# Patient Record
Sex: FEMALE | Race: BLACK OR AFRICAN AMERICAN | NOT HISPANIC OR LATINO
[De-identification: names, ages, dates, MRNs, and addresses within clinical notes are randomized per-mention and may not be internally consistent; named-entity substitution may affect disease eponyms.]

---

## 2017-06-08 ENCOUNTER — APPOINTMENT (OUTPATIENT)
Dept: ORTHOPEDIC SURGERY | Facility: CLINIC | Age: 62
End: 2017-06-08

## 2017-06-08 VITALS — SYSTOLIC BLOOD PRESSURE: 101 MMHG | HEART RATE: 71 BPM | DIASTOLIC BLOOD PRESSURE: 61 MMHG

## 2017-06-08 VITALS — BODY MASS INDEX: 21.85 KG/M2 | HEIGHT: 64 IN | WEIGHT: 128 LBS

## 2017-06-08 DIAGNOSIS — M54.6 PAIN IN THORACIC SPINE: ICD-10-CM

## 2017-06-08 DIAGNOSIS — Z78.9 OTHER SPECIFIED HEALTH STATUS: ICD-10-CM

## 2017-06-08 DIAGNOSIS — Z87.19 PERSONAL HISTORY OF OTHER DISEASES OF THE DIGESTIVE SYSTEM: ICD-10-CM

## 2017-06-08 DIAGNOSIS — Z86.39 PERSONAL HISTORY OF OTHER ENDOCRINE, NUTRITIONAL AND METABOLIC DISEASE: ICD-10-CM

## 2017-06-08 DIAGNOSIS — M54.2 CERVICALGIA: ICD-10-CM

## 2017-06-08 DIAGNOSIS — Z82.61 FAMILY HISTORY OF ARTHRITIS: ICD-10-CM

## 2017-06-08 DIAGNOSIS — Z87.891 PERSONAL HISTORY OF NICOTINE DEPENDENCE: ICD-10-CM

## 2017-06-08 RX ORDER — PANTOPRAZOLE SODIUM 20 MG/1
TABLET, DELAYED RELEASE ORAL
Refills: 0 | Status: ACTIVE | COMMUNITY

## 2017-06-08 RX ORDER — GLIMEPIRIDE 4 MG/1
TABLET ORAL
Refills: 0 | Status: ACTIVE | COMMUNITY

## 2017-06-08 RX ORDER — GLUC/MSM/COLGN2/HYAL/ANTIARTH3 375-375-20
TABLET ORAL
Refills: 0 | Status: ACTIVE | COMMUNITY

## 2017-12-31 ENCOUNTER — INPATIENT (INPATIENT)
Facility: HOSPITAL | Age: 62
LOS: 4 days | Discharge: HOME | DRG: 368 | End: 2018-01-05
Attending: INTERNAL MEDICINE | Admitting: INTERNAL MEDICINE
Payer: COMMERCIAL

## 2017-12-31 VITALS
TEMPERATURE: 98 F | DIASTOLIC BLOOD PRESSURE: 71 MMHG | WEIGHT: 110.01 LBS | HEIGHT: 64 IN | OXYGEN SATURATION: 100 % | SYSTOLIC BLOOD PRESSURE: 119 MMHG | HEART RATE: 79 BPM | RESPIRATION RATE: 19 BRPM

## 2017-12-31 DIAGNOSIS — S52.91XA UNSPECIFIED FRACTURE OF RIGHT FOREARM, INITIAL ENCOUNTER FOR CLOSED FRACTURE: Chronic | ICD-10-CM

## 2017-12-31 DIAGNOSIS — Z29.9 ENCOUNTER FOR PROPHYLACTIC MEASURES, UNSPECIFIED: ICD-10-CM

## 2017-12-31 DIAGNOSIS — I63.9 CEREBRAL INFARCTION, UNSPECIFIED: ICD-10-CM

## 2017-12-31 DIAGNOSIS — E11.9 TYPE 2 DIABETES MELLITUS WITHOUT COMPLICATIONS: ICD-10-CM

## 2017-12-31 DIAGNOSIS — R29.898 OTHER SYMPTOMS AND SIGNS INVOLVING THE MUSCULOSKELETAL SYSTEM: ICD-10-CM

## 2017-12-31 DIAGNOSIS — K21.9 GASTRO-ESOPHAGEAL REFLUX DISEASE WITHOUT ESOPHAGITIS: ICD-10-CM

## 2017-12-31 DIAGNOSIS — E87.1 HYPO-OSMOLALITY AND HYPONATREMIA: ICD-10-CM

## 2017-12-31 DIAGNOSIS — R63.4 ABNORMAL WEIGHT LOSS: ICD-10-CM

## 2017-12-31 DIAGNOSIS — Z90.710 ACQUIRED ABSENCE OF BOTH CERVIX AND UTERUS: Chronic | ICD-10-CM

## 2017-12-31 LAB
ALBUMIN SERPL ELPH-MCNC: 3.2 G/DL — LOW (ref 3.5–5)
ALP SERPL-CCNC: 77 U/L — SIGNIFICANT CHANGE UP (ref 40–120)
ALT FLD-CCNC: 35 U/L DA — SIGNIFICANT CHANGE UP (ref 10–60)
ANION GAP SERPL CALC-SCNC: 10 MMOL/L — SIGNIFICANT CHANGE UP (ref 5–17)
ANION GAP SERPL CALC-SCNC: 8 MMOL/L — SIGNIFICANT CHANGE UP (ref 5–17)
APPEARANCE UR: CLEAR — SIGNIFICANT CHANGE UP
APTT BLD: 27 SEC — LOW (ref 27.5–37.4)
AST SERPL-CCNC: 45 U/L — HIGH (ref 10–40)
BASOPHILS # BLD AUTO: 0.1 K/UL — SIGNIFICANT CHANGE UP (ref 0–0.2)
BASOPHILS NFR BLD AUTO: 1.3 % — SIGNIFICANT CHANGE UP (ref 0–2)
BILIRUB SERPL-MCNC: 0.5 MG/DL — SIGNIFICANT CHANGE UP (ref 0.2–1.2)
BILIRUB UR-MCNC: NEGATIVE — SIGNIFICANT CHANGE UP
BUN SERPL-MCNC: 32 MG/DL — HIGH (ref 7–18)
BUN SERPL-MCNC: 39 MG/DL — HIGH (ref 7–18)
CALCIUM SERPL-MCNC: 9.2 MG/DL — SIGNIFICANT CHANGE UP (ref 8.4–10.5)
CALCIUM SERPL-MCNC: 9.9 MG/DL — SIGNIFICANT CHANGE UP (ref 8.4–10.5)
CHLORIDE SERPL-SCNC: 88 MMOL/L — LOW (ref 96–108)
CHLORIDE SERPL-SCNC: 95 MMOL/L — LOW (ref 96–108)
CO2 SERPL-SCNC: 29 MMOL/L — SIGNIFICANT CHANGE UP (ref 22–31)
CO2 SERPL-SCNC: 29 MMOL/L — SIGNIFICANT CHANGE UP (ref 22–31)
COLOR SPEC: YELLOW — SIGNIFICANT CHANGE UP
CREAT ?TM UR-MCNC: 254 MG/DL — SIGNIFICANT CHANGE UP
CREAT SERPL-MCNC: 0.84 MG/DL — SIGNIFICANT CHANGE UP (ref 0.5–1.3)
CREAT SERPL-MCNC: 1.23 MG/DL — SIGNIFICANT CHANGE UP (ref 0.5–1.3)
DIFF PNL FLD: ABNORMAL
EOSINOPHIL # BLD AUTO: 0.1 K/UL — SIGNIFICANT CHANGE UP (ref 0–0.5)
EOSINOPHIL NFR BLD AUTO: 0.8 % — SIGNIFICANT CHANGE UP (ref 0–6)
FERRITIN SERPL-MCNC: 146 NG/ML — SIGNIFICANT CHANGE UP (ref 15–150)
FOLATE SERPL-MCNC: 8 NG/ML — SIGNIFICANT CHANGE UP (ref 4.8–24.2)
GLUCOSE BLDC GLUCOMTR-MCNC: 109 MG/DL — HIGH (ref 70–99)
GLUCOSE BLDC GLUCOMTR-MCNC: 194 MG/DL — HIGH (ref 70–99)
GLUCOSE BLDC GLUCOMTR-MCNC: 88 MG/DL — SIGNIFICANT CHANGE UP (ref 70–99)
GLUCOSE SERPL-MCNC: 103 MG/DL — HIGH (ref 70–99)
GLUCOSE SERPL-MCNC: 115 MG/DL — HIGH (ref 70–99)
GLUCOSE UR QL: 50 MG/DL
HBA1C BLD-MCNC: 6.7 % — HIGH (ref 4–5.6)
HCT VFR BLD CALC: 34.3 % — LOW (ref 34.5–45)
HCT VFR BLD CALC: 41.9 % — SIGNIFICANT CHANGE UP (ref 34.5–45)
HGB BLD-MCNC: 10.3 G/DL — LOW (ref 11.5–15.5)
HGB BLD-MCNC: 12.3 G/DL — SIGNIFICANT CHANGE UP (ref 11.5–15.5)
INR BLD: 1.14 RATIO — SIGNIFICANT CHANGE UP (ref 0.88–1.16)
IRON SATN MFR SERPL: 24 % — SIGNIFICANT CHANGE UP (ref 15–50)
IRON SATN MFR SERPL: 56 UG/DL — SIGNIFICANT CHANGE UP (ref 40–150)
KETONES UR-MCNC: ABNORMAL
LDH SERPL L TO P-CCNC: 251 U/L — HIGH (ref 120–225)
LEUKOCYTE ESTERASE UR-ACNC: ABNORMAL
LYMPHOCYTES # BLD AUTO: 1.8 K/UL — SIGNIFICANT CHANGE UP (ref 1–3.3)
LYMPHOCYTES # BLD AUTO: 18.4 % — SIGNIFICANT CHANGE UP (ref 13–44)
MAGNESIUM SERPL-MCNC: 2.1 MG/DL — SIGNIFICANT CHANGE UP (ref 1.6–2.6)
MCHC RBC-ENTMCNC: 22 PG — LOW (ref 27–34)
MCHC RBC-ENTMCNC: 22.5 PG — LOW (ref 27–34)
MCHC RBC-ENTMCNC: 29.4 GM/DL — LOW (ref 32–36)
MCHC RBC-ENTMCNC: 30.1 GM/DL — LOW (ref 32–36)
MCV RBC AUTO: 74.7 FL — LOW (ref 80–100)
MCV RBC AUTO: 75 FL — LOW (ref 80–100)
MONOCYTES # BLD AUTO: 1.2 K/UL — HIGH (ref 0–0.9)
MONOCYTES NFR BLD AUTO: 12.3 % — SIGNIFICANT CHANGE UP (ref 2–14)
NEUTROPHILS # BLD AUTO: 6.5 K/UL — SIGNIFICANT CHANGE UP (ref 1.8–7.4)
NEUTROPHILS NFR BLD AUTO: 67.1 % — SIGNIFICANT CHANGE UP (ref 43–77)
NITRITE UR-MCNC: NEGATIVE — SIGNIFICANT CHANGE UP
OSMOLALITY UR: 705 MOS/KG — SIGNIFICANT CHANGE UP (ref 50–1200)
PH UR: 5 — SIGNIFICANT CHANGE UP (ref 5–8)
PHOSPHATE SERPL-MCNC: 1.4 MG/DL — LOW (ref 2.5–4.5)
PLATELET # BLD AUTO: 258 K/UL — SIGNIFICANT CHANGE UP (ref 150–400)
PLATELET # BLD AUTO: 311 K/UL — SIGNIFICANT CHANGE UP (ref 150–400)
POTASSIUM SERPL-MCNC: 3.7 MMOL/L — SIGNIFICANT CHANGE UP (ref 3.5–5.3)
POTASSIUM SERPL-MCNC: 4.2 MMOL/L — SIGNIFICANT CHANGE UP (ref 3.5–5.3)
POTASSIUM SERPL-SCNC: 3.7 MMOL/L — SIGNIFICANT CHANGE UP (ref 3.5–5.3)
POTASSIUM SERPL-SCNC: 4.2 MMOL/L — SIGNIFICANT CHANGE UP (ref 3.5–5.3)
PROT SERPL-MCNC: 8.9 G/DL — HIGH (ref 6–8.3)
PROT UR-MCNC: 30 MG/DL
PROTHROM AB SERPL-ACNC: 12.5 SEC — SIGNIFICANT CHANGE UP (ref 9.8–12.7)
RBC # BLD: 4.59 M/UL — SIGNIFICANT CHANGE UP (ref 3.8–5.2)
RBC # BLD: 5.58 M/UL — HIGH (ref 3.8–5.2)
RBC # FLD: 14 % — SIGNIFICANT CHANGE UP (ref 10.3–14.5)
RBC # FLD: 14.3 % — SIGNIFICANT CHANGE UP (ref 10.3–14.5)
SODIUM SERPL-SCNC: 127 MMOL/L — LOW (ref 135–145)
SODIUM SERPL-SCNC: 132 MMOL/L — LOW (ref 135–145)
SODIUM UR-SCNC: 15 MMOL/L — LOW (ref 40–220)
SP GR SPEC: 1.01 — SIGNIFICANT CHANGE UP (ref 1.01–1.02)
TIBC SERPL-MCNC: 234 UG/DL — LOW (ref 250–450)
TROPONIN I SERPL-MCNC: 0.03 NG/ML — SIGNIFICANT CHANGE UP (ref 0–0.04)
TSH SERPL-MCNC: 0.98 UU/ML — SIGNIFICANT CHANGE UP (ref 0.34–4.82)
UIBC SERPL-MCNC: 178 UG/DL — SIGNIFICANT CHANGE UP (ref 110–370)
UROBILINOGEN FLD QL: 1
VIT B12 SERPL-MCNC: 1379 PG/ML — HIGH (ref 232–1245)
WBC # BLD: 5.7 K/UL — SIGNIFICANT CHANGE UP (ref 3.8–10.5)
WBC # BLD: 9.7 K/UL — SIGNIFICANT CHANGE UP (ref 3.8–10.5)
WBC # FLD AUTO: 5.7 K/UL — SIGNIFICANT CHANGE UP (ref 3.8–10.5)
WBC # FLD AUTO: 9.7 K/UL — SIGNIFICANT CHANGE UP (ref 3.8–10.5)

## 2017-12-31 PROCEDURE — 99255 IP/OBS CONSLTJ NEW/EST HI 80: CPT

## 2017-12-31 PROCEDURE — 72125 CT NECK SPINE W/O DYE: CPT | Mod: 26

## 2017-12-31 PROCEDURE — 99285 EMERGENCY DEPT VISIT HI MDM: CPT | Mod: 25

## 2017-12-31 PROCEDURE — 83020 HEMOGLOBIN ELECTROPHORESIS: CPT | Mod: 26

## 2017-12-31 PROCEDURE — 70450 CT HEAD/BRAIN W/O DYE: CPT | Mod: 26

## 2017-12-31 RX ORDER — FAMOTIDINE 10 MG/ML
20 INJECTION INTRAVENOUS
Qty: 0 | Refills: 0 | Status: DISCONTINUED | OUTPATIENT
Start: 2017-12-31 | End: 2017-12-31

## 2017-12-31 RX ORDER — SODIUM CHLORIDE 9 MG/ML
1000 INJECTION INTRAMUSCULAR; INTRAVENOUS; SUBCUTANEOUS ONCE
Qty: 0 | Refills: 0 | Status: COMPLETED | OUTPATIENT
Start: 2017-12-31 | End: 2017-12-31

## 2017-12-31 RX ORDER — SODIUM CHLORIDE 9 MG/ML
500 INJECTION INTRAMUSCULAR; INTRAVENOUS; SUBCUTANEOUS ONCE
Qty: 0 | Refills: 0 | Status: COMPLETED | OUTPATIENT
Start: 2017-12-31 | End: 2017-12-31

## 2017-12-31 RX ORDER — POTASSIUM PHOSPHATE, MONOBASIC POTASSIUM PHOSPHATE, DIBASIC 236; 224 MG/ML; MG/ML
15 INJECTION, SOLUTION INTRAVENOUS ONCE
Qty: 0 | Refills: 0 | Status: COMPLETED | OUTPATIENT
Start: 2017-12-31 | End: 2017-12-31

## 2017-12-31 RX ORDER — INSULIN LISPRO 100/ML
VIAL (ML) SUBCUTANEOUS
Qty: 0 | Refills: 0 | Status: DISCONTINUED | OUTPATIENT
Start: 2017-12-31 | End: 2018-01-05

## 2017-12-31 RX ORDER — GLIMEPIRIDE 1 MG
0 TABLET ORAL
Qty: 0 | Refills: 0 | COMMUNITY

## 2017-12-31 RX ORDER — GLIMEPIRIDE 1 MG
0 TABLET ORAL
Qty: 30 | Refills: 0 | COMMUNITY

## 2017-12-31 RX ORDER — ATORVASTATIN CALCIUM 80 MG/1
40 TABLET, FILM COATED ORAL AT BEDTIME
Qty: 0 | Refills: 0 | Status: DISCONTINUED | OUTPATIENT
Start: 2017-12-31 | End: 2018-01-05

## 2017-12-31 RX ORDER — SODIUM CHLORIDE 9 MG/ML
1000 INJECTION INTRAMUSCULAR; INTRAVENOUS; SUBCUTANEOUS
Qty: 0 | Refills: 0 | Status: DISCONTINUED | OUTPATIENT
Start: 2017-12-31 | End: 2018-01-01

## 2017-12-31 RX ORDER — FAMOTIDINE 10 MG/ML
40 INJECTION INTRAVENOUS
Qty: 0 | Refills: 0 | Status: DISCONTINUED | OUTPATIENT
Start: 2017-12-31 | End: 2017-12-31

## 2017-12-31 RX ORDER — ENOXAPARIN SODIUM 100 MG/ML
40 INJECTION SUBCUTANEOUS DAILY
Qty: 0 | Refills: 0 | Status: DISCONTINUED | OUTPATIENT
Start: 2017-12-31 | End: 2018-01-04

## 2017-12-31 RX ORDER — PANTOPRAZOLE SODIUM 20 MG/1
40 TABLET, DELAYED RELEASE ORAL
Qty: 0 | Refills: 0 | Status: DISCONTINUED | OUTPATIENT
Start: 2017-12-31 | End: 2018-01-05

## 2017-12-31 RX ADMIN — ATORVASTATIN CALCIUM 40 MILLIGRAM(S): 80 TABLET, FILM COATED ORAL at 22:01

## 2017-12-31 RX ADMIN — SODIUM CHLORIDE 2000 MILLILITER(S): 9 INJECTION INTRAMUSCULAR; INTRAVENOUS; SUBCUTANEOUS at 09:10

## 2017-12-31 RX ADMIN — POTASSIUM PHOSPHATE, MONOBASIC POTASSIUM PHOSPHATE, DIBASIC 62.5 MILLIMOLE(S): 236; 224 INJECTION, SOLUTION INTRAVENOUS at 22:31

## 2017-12-31 RX ADMIN — SODIUM CHLORIDE 75 MILLILITER(S): 9 INJECTION INTRAMUSCULAR; INTRAVENOUS; SUBCUTANEOUS at 22:31

## 2017-12-31 RX ADMIN — SODIUM CHLORIDE 2000 MILLILITER(S): 9 INJECTION INTRAMUSCULAR; INTRAVENOUS; SUBCUTANEOUS at 04:13

## 2017-12-31 RX ADMIN — Medication 1: at 22:31

## 2017-12-31 RX ADMIN — PANTOPRAZOLE SODIUM 40 MILLIGRAM(S): 20 TABLET, DELAYED RELEASE ORAL at 18:15

## 2017-12-31 RX ADMIN — ENOXAPARIN SODIUM 40 MILLIGRAM(S): 100 INJECTION SUBCUTANEOUS at 12:45

## 2017-12-31 RX ADMIN — SODIUM CHLORIDE 2000 MILLILITER(S): 9 INJECTION INTRAMUSCULAR; INTRAVENOUS; SUBCUTANEOUS at 02:42

## 2017-12-31 RX ADMIN — SODIUM CHLORIDE 75 MILLILITER(S): 9 INJECTION INTRAMUSCULAR; INTRAVENOUS; SUBCUTANEOUS at 10:09

## 2017-12-31 NOTE — ED ADULT NURSE REASSESSMENT NOTE - NS ED NURSE REASSESS COMMENT FT1
Pt admitted to the telemetry floor. Pt is resting comfortably in bed, pt was able to eat and keep down yogurt. Report giving to RUI yung

## 2017-12-31 NOTE — ED PROVIDER NOTE - OBJECTIVE STATEMENT
62yoF with h/o DM p/w weakness. States that 11AM today when dressing noted LUE weakness primarily around shoulder joint, unsure when it resolved but states currently resolved. States has had GERD and belches a lot, causing regurgitation, and as a result has not had an appetite and not eaten very well x1 wk and lost 10 lbs as a result. Denies current neuro deficits, slurred speech, weakness, sens loss, CP, SOB, or any other symptoms.  Meds: glimepiride

## 2017-12-31 NOTE — ED PROVIDER NOTE - MEDICAL DECISION MAKING DETAILS
LUE weakness since 11AM today, mild on exam. CTH negative. Also noted dehydration. Well appearing, hemodynamically stable. Admitted for concern for CVA and stroke w/u and monitoring.

## 2017-12-31 NOTE — ED ADULT TRIAGE NOTE - OTHER COMPLAINTS
pt also stated her left arm feels weak also started this morning pt also stated her left arm feels weak also started this morning at 11 am pt also stated her left arm feels weak  started this morning at 11 am

## 2017-12-31 NOTE — H&P ADULT - PROBLEM SELECTOR PLAN 4
-Reporting worsening symptoms with weight loss  -May warrant non emergent EGD and/or CT of the abdomen for further investigation -Reporting worsening symptoms with weight loss  -Famotidine 20mg q 12hrs for symptom control; acid reflux  -May warrant non emergent EGD and/or CT of the abdomen for further investigation -Reporting worsening symptoms with weight loss  -Protonix 40mg q 12hrs for symptom control; acid reflux. Discussed with Dr. Perodmo  -May warrant non emergent EGD and/or CT of the abdomen for further investigation

## 2017-12-31 NOTE — ED ADULT NURSE REASSESSMENT NOTE - NS ED NURSE REASSESS COMMENT FT1
Patient received from RUI Waggoner, alert, responsive, resting in stretcher, 20G to left AC. Awaiting inpatient bed. On tele box MARIYA

## 2017-12-31 NOTE — H&P ADULT - HISTORY OF PRESENT ILLNESS
62 years old female from home walks independently works as a , with PMH of DM II, CHRISTIAN, GERD, and PSH of bowel ileus s/p laparotomy, s/p hysterectomy, C/S x2, and Rt wrist fracture with metal plate and screw(2013) came to ED c/o left side arm weakness onset yesterday morning which lasted a few hours and then resolved. No other focal neurologic symptoms such as change in mentation, weakness of the other limbs, slurred speech or decreased sensation of any part of the body. Pt has lost 10 lbs over the last week with worsening acid regurgitation and frequent nausea, but did not vomit or have any bowel habit change. Denies fever, chills, chest pain, SOB, or any other symptoms. Last check-up with PMD was in August this year, her Hba1c was 6.6 and everything was fine per patient. Last EGD: 3 years ago, last colonoscopy: about 5 years ago, without any significant findings.     In ED, patient' VS borderline BP(her usual SBP is around 110),  EKG NSR at 68BPM, CT head negative, 62 years old female from home walks independently works as a , with PMH of DM II, CHRISTIAN, GERD, and PSH of bowel ileus s/p laparotomy, s/p hysterectomy, C/S x2, and Rt wrist fracture with metal plate and screw(2013) came to ED c/o left side arm weakness onset yesterday morning which lasted a few hours and then resolved. No other focal neurologic symptoms such as change in mentation, weakness of the other limbs, slurred speech or decreased sensation of any part of the body. Pt has lost 10 lbs over the last week with worsening acid regurgitation and frequent nausea, but did not vomit or have any bowel habit change. She also reports poor PO intake. Denies fever, chills, chest pain, SOB, or any other symptoms. Last check-up with PMD was in August this year, her Hba1c was 6.6 and everything was fine per patient. She stopped taking glimepiride 1 month ago on her own, wanted to control her DM with diet modification. Last EGD: 3 years ago, last colonoscopy: about 5 years ago, without any significant findings.     In ED, patient' VS borderline BP(her usual SBP is around 110), EKG NSR at 68BPM, CT head negative, labs Na 127, BUN/Cr 39/1.23, CE x1 neg, H/H 12.3/41.9 microcytic hypochromic with normal RDW. Currently pt does not have LROM. Pt is admitted for suspected TIA, and generalized weakness from hyponatremia likely from poor oral intake.

## 2017-12-31 NOTE — CONSULT NOTE ADULT - ASSESSMENT
Impression:  left arm weakness, transient, lasting for hours concerning for ischemic stroke       Recommendations:  1.             Admit to telemetry   2.             MRI brain, MRA head without contrast, Carotid duplex (CD).  If unable to get MR imaging, please consider CTA head and neck in 24hours (no need for CD in this case).  If the patient is unable to get MR and unable to get IV contrast please repeat the CTH in 24hours and get a CD.  3.             TTE  4.             Please check HbA1C and fasting lipid profile  5.             Start ASA 81mg  and Lipitor 40mg HS  6.             BP goal of normal  7.             Frequent neurochecks  8.          Formal speech and swallow evaluation  9.          PT evaluation  10.          STAT CTH IF the patient has sudden change in mental status or neurological exam  11.          DVT PPx    Thank you for the courtesy of this consult.

## 2017-12-31 NOTE — H&P ADULT - NSHPPHYSICALEXAM_GEN_ALL_CORE
PHYSICAL EXAM:  GENERAL: NAD, cachectic  HEAD:  Atraumatic, Normocephalic  EYES: EOMI, PERRLA, conjunctiva and sclera clear  ENMT: No tonsillar erythema, exudates, or enlargement; dry mucous membranes, Good dentition, No lesions  NECK: Supple, No JVD, Normal thyroid  NERVOUS SYSTEM:  Alert & Oriented X3, Good concentration; Motor Strength 5/5 B/L upper and lower extremities  CHEST/LUNG: Clear to percussion bilaterally; No rales, rhonchi, wheezing, or rubs  HEART: Regular rate and rhythm; No murmurs, rubs, or gallops  ABDOMEN: Soft, Nontender, Nondistended; Bowel sounds present  EXTREMITIES:  2+ Peripheral Pulses bilaterally, No clubbing, cyanosis, or edema  LYMPH: No lymphadenopathy noted  SKIN: No rashes or lesions    T(C): 37 (12-31-17 @ 07:33), Max: 37 (12-31-17 @ 07:33)  HR: 67 (12-31-17 @ 07:33) (67 - 79)  BP: 97/60 (12-31-17 @ 07:33) (97/60 - 119/71)  RR: 19 (12-31-17 @ 07:33) (18 - 19)  SpO2: 98% (12-31-17 @ 07:33) (98% - 100%)  Wt(kg): --  I&O's Summary

## 2017-12-31 NOTE — ED PROVIDER NOTE - PHYSICAL EXAMINATION
Afebrile, hemodynamically stable  NAD, well appearing  Head NCAT  EOMI  MMM  RRR, nml S1/S2, no m/r/g  Lungs CTAB, no w/r/r  Abd soft, NT, ND, nml BS, no rebound or guarding  AAO, CN's 3-12 intact. NIH 1 as per below, no other deficits.  GOVEA spontaneously, no leg cyanosis or edema  Skin warm, dry, no rashes or hives

## 2017-12-31 NOTE — ED ADULT NURSE NOTE - CHIEF COMPLAINT QUOTE
c/o feeling weak/unable to eat x 1 week and stated she lost 10 lbs/pt also stated her left arm feels weak started this morning at 11 am

## 2017-12-31 NOTE — CONSULT NOTE ADULT - SUBJECTIVE AND OBJECTIVE BOX
Patient is a 62y old  Female who presents with a chief complaint of Left side arm weakness onset yesterday morning (31 Dec 2017 09:13)      HPI:  62 years old female from home walks independently works as a , with PMH of DM II, CHRISTIAN, GERD, and PSH of bowel ileus s/p laparotomy, s/p hysterectomy, C/S x2, and Rt wrist fracture with metal plate and screw() came to ED c/o left side arm weakness onset yesterday morning which lasted a few hours and then resolved. No other focal neurologic symptoms such as change in mentation, weakness of the other limbs, slurred speech or decreased sensation of any part of the body. Pt has lost 10 lbs over the last week with worsening acid regurgitation and frequent nausea, but did not vomit or have any bowel habit change. She also reports poor PO intake. Denies fever, chills, chest pain, SOB, or any other symptoms. Last check-up with PMD was in August this year, her Hba1c was 6.6 and everything was fine per patient. She stopped taking glimepiride 1 month ago on her own, wanted to control her DM with diet modification. Last EGD: 3 years ago, last colonoscopy: about 5 years ago, without any significant findings.     In ED, patient' VS borderline BP(her usual SBP is around 110), EKG NSR at 68BPM, CT head negative, labs Na 127, BUN/Cr 39/1.23, CE x1 neg, H/H 12.3/41.9 microcytic hypochromic with normal RDW. Currently pt does not have LROM. Pt is admitted for suspected TIA, and generalized weakness from hyponatremia likely from poor oral intake. (31 Dec 2017 09:13)           The patient was last know well at  The patient lives at home/ NH.  The patient walks without assistance/ with a cane or walker    Neurological Review of Systems:  No difficulty with language.  No vision loss or double vision.  No dizziness, vertigo or new hearing loss.  No difficulty with speech or swallowing.  No focal weakness.  No focal sensory changes.  No numbness or tingling in the bilateral lower extremities.  No difficulty with balance.  No difficulty with ambulation.      MEDICATIONS  (STANDING):  atorvastatin 40 milliGRAM(s) Oral at bedtime  enoxaparin Injectable 40 milliGRAM(s) SubCutaneous daily  insulin lispro (HumaLOG) corrective regimen sliding scale   SubCutaneous Before meals and at bedtime  pantoprazole    Tablet 40 milliGRAM(s) Oral two times a day before meals  sodium chloride 0.9%. 1000 milliLiter(s) (75 mL/Hr) IV Continuous <Continuous>    MEDICATIONS  (PRN):    Allergies    aspirin (Nausea)    Intolerances      PAST MEDICAL & SURGICAL HISTORY:  GERD (gastroesophageal reflux disease)  Diabetes  Closed right radial fracture   delivery delivered  H/O: hysterectomy    FAMILY HISTORY:  No pertinent family history in first degree relatives    SOCIAL HISTORY: non smoker/ former smoker/ active smoker    Review of Systems:  Constitutional: No generalized weakness. No fevers or chills.                    Eyes, Ears, Mouth, Throat: No vision loss   Respiratory: No shortness of breath or cough.                                Cardiovascular: No chest pain or palpitations  Gastrointestinal: No nausea or vomiting.                                         Genitourinary: No urinary incontinence or burning on urination.  Musculoskeletal: No joint pain.                                                           Dermatologic: No rash.  Neurological: as per HPI                                                                      Psychiatric: No behavioral problems.  Endocrine: No known hypoglycemia.               Hematologic/Lymphatic: No easy bleeding.    O:  Vital Signs Last 24 Hrs  T(C): 36.6 (31 Dec 2017 12:05), Max: 37 (31 Dec 2017 07:33)  T(F): 97.8 (31 Dec 2017 12:05), Max: 98.6 (31 Dec 2017 07:33)  HR: 67 (31 Dec 2017 12:05) (67 - 79)  BP: 97/60 (31 Dec 2017 12:05) (97/60 - 119/71)  BP(mean): --  RR: 19 (31 Dec 2017 12:05) (18 - 19)  SpO2: 98% (31 Dec 2017 12:05) (98% - 100%)    General Exam:   General appearance: No acute distress                 Cardiovascular: Pedal dorsalis pulses intact bilaterally    Neurological Exam:  NIH Stroke Scale (NIHSS):   1a. LOConscious:  0-alert 1-lethargy 2-obtund 3-coma:    _____  1b. LOC Questions:  0-both 1-one 2-none                       _____  1c. LOC Commands:  0-both 1-one 2-none                     _____  2.   Gaze:  0-nl 1-partial 2-conjugate                                _____  3.   Visual:  0-nl 1-part kendra 2-full kendra 3-bilat kendra         _____  4.   Facial Palsy:  0-nl 1-minor 2-part 3-complete             _____  5.   Motor Arm:  0-nl 1-drift 2-effort 3-no effort         Left             _____                              4-no move UN-amputated                     Right  _____  6.   Motor Leg:                                                                 Left   _____                                                                                   Right _____  7.   Ataxia:  0-nl 1-one limb 2-two UN-amp                      _____  8.   Sensory:  0-nl 1-mild 2-severe                                  _____  9.   Language:  0-nl 1-mild 2-severe 3-mute                     _____  10.  Dysarthria:  0-nl 1-mild 2-severe 3-barrier                  _____  11.  Extinction/Inattention:  0-nl 1-mild 2-deep                 _____         TOTAL NIHSS       ________    Mental Status: Orientated to self, date and place.  Attention intact.  No dysarthria, aphasia or neglect.  Knowledge intact.  Registration intact.  Short and long term memory grossly intact.      Cranial Nerves: CN I - not tested.  PERRL, EOMI, VFF, no nystagmus or diplopia.  No APD.  Fundi not visualized bilaterally.  CN V1-3 intact to light touch and pinprick.  No facial asymmetry.  Hearing intact to finger rub bilaterally.  Tongue, uvula and palate midline.  Sternocleidomastoid and Trapezius intact bilaterally.    Motor:   Tone: normal.                  Strength intact throughout  No pronator drift bilaterally                      No dysmetria on finger-nose-finger or heel-shin-heel  No truncal ataxia.  No resting, postural or action tremor.  No myoclonus.    Sensation: intact to light touch, pinprick, vibration and proprioception    Deep Tendon Reflexes: 1+ bilateral biceps, triceps, brachioradialis, knee and ankle  Toes flexor bilaterally    Gait: normal and stable.  Rhomberg -avis.    Other:      LABS:                        10.3   5.7   )-----------( 258      ( 31 Dec 2017 10:35 )             34.3     12-    132<L>  |  95<L>  |  32<H>  ----------------------------<  115<H>  3.7   |  29  |  0.84    Ca    9.2      31 Dec 2017 10:35  Phos  1.4       Mg     2.1         TPro  8.9<H>  /  Alb  3.2<L>  /  TBili  0.5  /  DBili  x   /  AST  45<H>  /  ALT  35  /  AlkPhos  77      PT/INR - ( 31 Dec 2017 10:35 )   PT: 12.5 sec;   INR: 1.14 ratio         PTT - ( 31 Dec 2017 10:35 )  PTT:27.0 sec    LDL  [id number] A1C, Whole Blood: 6.7 % ( @ 13:51)      RADIOLOGY & ADDITIONAL STUDIES:    EKG:  < from: CT Head No Cont (17 @ 03:13) > (images reviwed)  IMPRESSION:     No acute intracranial hemorrhage or mass effect.    If clinical symptoms persist or worsen, more sensitive evaluation with   brain MRI may be obtained, if no contraindications exist.    < end of copied text >    Impression:  left arm weakness       Recommendations:  1.             Admit to telemetry   2.             MRI brain, MRA head without contrast, Carotid duplex (CD).  If unable to get MR imaging, please consider CTA head and neck in 24hours (no need for CD in this case).  If the patient is unable to get MR and unable to get IV contrast please repeat the CTH in 24hours and get a CD.  3.             TTE  4.             Please check HbA1C and fasting lipid profile  5.             Start ASA 81mg (or ASA 325mg rectally) and Lipitor 40mg HS  6.             BP goal of normal/ permissive HTN of SBP <200/<180<160  7.             NS at 125 cc/h/ D5 NS at 125 cc/hour, if NPO, if cardiac function allows, to ensure good perfusion  8.             Frequent neurochecks  9.             Urine Tox  10.          Able to resume normal diet as passed bedside swallowing test/ NPO for now  11.          Formal speech and swallow evaluation  12.          PT evaluation  13.          STAT CTH IF the patient has sudden change in mental status or neurological exam  14.          DVT PPx    Thank you for the courtesy of this consult. HPI:  62 years old female from home walks independently works as a , with PMH of DM II, CHRISTIAN, GERD, and PSH of bowel ileus s/p laparotomy, s/p hysterectomy, C/S x2, and Rt wrist fracture with metal plate and screw() came to ED c/o left side arm weakness onset yesterday morning around 11am.  Symptoms lasted for a few hours and then resolved. No difficulty with language.  No vision loss or double vision.  No dizziness, vertigo or new hearing loss.  No difficulty with speech or swallowing.  No focal weakness.  No focal sensory changes.  No numbness or tingling in the bilateral lower extremities.  No difficulty with balance.  No difficulty with ambulation.      patient lives at home and ambulates without help.    MEDICATIONS  (STANDING):  atorvastatin 40 milliGRAM(s) Oral at bedtime  enoxaparin Injectable 40 milliGRAM(s) SubCutaneous daily  insulin lispro (HumaLOG) corrective regimen sliding scale   SubCutaneous Before meals and at bedtime  pantoprazole    Tablet 40 milliGRAM(s) Oral two times a day before meals  sodium chloride 0.9%. 1000 milliLiter(s) (75 mL/Hr) IV Continuous <Continuous>    MEDICATIONS  (PRN):    Allergies    aspirin (Nausea)    Intolerances      PAST MEDICAL & SURGICAL HISTORY:  GERD (gastroesophageal reflux disease)  Diabetes  Closed right radial fracture   delivery delivered  H/O: hysterectomy    FAMILY HISTORY:  stroke in mother    SOCIAL HISTORY: former smoker    Review of Systems:  Constitutional: No generalized weakness. No fevers or chills.                    Eyes, Ears, Mouth, Throat: No vision loss   Respiratory: No shortness of breath or cough.                                Cardiovascular: No chest pain or palpitations  Gastrointestinal: No nausea or vomiting.                                         Genitourinary: No urinary incontinence or burning on urination.  Musculoskeletal: No joint pain.                                                           Dermatologic: No rash.  Neurological: as per HPI                                                                      Psychiatric: No behavioral problems.  Endocrine: No known hypoglycemia.               Hematologic/Lymphatic: No easy bleeding.    O:  Vital Signs Last 24 Hrs  T(C): 36.6 (31 Dec 2017 12:05), Max: 37 (31 Dec 2017 07:33)  T(F): 97.8 (31 Dec 2017 12:05), Max: 98.6 (31 Dec 2017 07:33)  HR: 67 (31 Dec 2017 12:05) (67 - 79)  BP: 97/60 (31 Dec 2017 12:05) (97/60 - 119/71)  BP(mean): --  RR: 19 (31 Dec 2017 12:05) (18 - 19)  SpO2: 98% (31 Dec 2017 12:05) (98% - 100%)    General Exam:   General appearance: No acute distress                 Cardiovascular: Pedal dorsalis pulses intact bilaterally    Neurological Exam:  NIH Stroke Scale (NIHSS):   1a. LOConscious:  0-alert 1-lethargy 2-obtund 3-coma:    _0____  1b. LOC Questions:  0-both 1-one 2-none                       __0___  1c. LOC Commands:  0-both 1-one 2-none                     ___0__  2.   Gaze:  0-nl 1-partial 2-conjugate                                ___0__  3.   Visual:  0-nl 1-part kendra 2-full kendra 3-bilat kendra         ____0_  4.   Facial Palsy:  0-nl 1-minor 2-part 3-complete             ____0_  5.   Motor Arm:  0-nl 1-drift 2-effort 3-no effort         Left             _0____                              4-no move UN-amputated                     Right  _0____  6.   Motor Leg:                                                                 Left   ___0__                                                                                   Right ___0__  7.   Ataxia:  0-nl 1-one limb 2-two UN-amp                      ___0__  8.   Sensory:  0-nl 1-mild 2-severe                                  ___0__  9.   Language:  0-nl 1-mild 2-severe 3-mute                     __0___  10.  Dysarthria:  0-nl 1-mild 2-severe 3-barrier                  __0___  11.  Extinction/Inattention:  0-nl 1-mild 2-deep                 ___0__         TOTAL NIHSS       __0______    Mental Status: Orientated to self, date and place.  Attention intact.  No dysarthria, aphasia or neglect.  Knowledge intact.  Registration intact.  Short and long term memory grossly intact.      Cranial Nerves: CN I - not tested.  PERRL, EOMI, VFF, no nystagmus or diplopia.  No APD.  Fundi not visualized bilaterally.  CN V1-3 intact to light touch.  No facial asymmetry.  Hearing intact to finger rub bilaterally.  Tongue, uvula and palate midline.  Sternocleidomastoid and Trapezius intact bilaterally.    Motor:   Tone: normal.                  Strength intact throughout  No pronator drift bilaterally                      No dysmetria on finger-nose-finger or heel-shin-heel  No truncal ataxia.  No resting, postural or action tremor.  No myoclonus.    Sensation: intact to light touch    Deep Tendon Reflexes: 1+ bilateral biceps, triceps, brachioradialis, knee and ankle  Toes flexor bilaterally    Gait: normal and stable.      Other:      LABS:                        10.3   5.7   )-----------( 258      ( 31 Dec 2017 10:35 )             34.3     12-    132<L>  |  95<L>  |  32<H>  ----------------------------<  115<H>  3.7   |  29  |  0.84    Ca    9.2      31 Dec 2017 10:35  Phos  1.4     12  Mg     2.1         TPro  8.9<H>  /  Alb  3.2<L>  /  TBili  0.5  /  DBili  x   /  AST  45<H>  /  ALT  35  /  AlkPhos  77  12-    PT/INR - ( 31 Dec 2017 10:35 )   PT: 12.5 sec;   INR: 1.14 ratio         PTT - ( 31 Dec 2017 10:35 )  PTT:27.0 sec    LDL  [id number] A1C, Whole Blood: 6.7 % ( @ 13:51)      RADIOLOGY & ADDITIONAL STUDIES:    EKG: NSR  < from: CT Head No Cont (17 @ 03:13) > (images reviwed)  IMPRESSION:     No acute intracranial hemorrhage or mass effect.    If clinical symptoms persist or worsen, more sensitive evaluation with   brain MRI may be obtained, if no contraindications exist.    < end of copied text >

## 2017-12-31 NOTE — H&P ADULT - PROBLEM SELECTOR PLAN 1
-Likely TIA given complete symptom resolve.  -Monitor telemetry, trend cardiac enzymes  -Starting Statin, consider starting plavix as pt has worsening acid reflux from aspirin.   -Check TTE and carotid sono -Likely TIA given complete symptom resolve.  -Monitor telemetry, trend cardiac enzymes  -Starting Statin, consider starting plavix as pt has worsening acid reflux from aspirin.   -Check TTE and carotid sono  -Check C-spine CT, discussed with Dr. Perdomo  -Neuro Consult Dr. Rendon.

## 2017-12-31 NOTE — H&P ADULT - NSHPSOCIALHISTORY_GEN_ALL_CORE
No EtOH   Ex-smoker, quit 27 years ago, 0.2PPD x less than 10 years  No illicit drug use  Last mammogram: early this year, normal

## 2017-12-31 NOTE — ED ADULT TRIAGE NOTE - CHIEF COMPLAINT QUOTE
c/o feeling weak/unable to eat x 1 week and stated she lost 10 lbs c/o feeling weak/unable to eat x 1 week and stated she lost 10 lbs/pt also stated her left arm feels weak started this morning at 11 am

## 2017-12-31 NOTE — H&P ADULT - PROBLEM SELECTOR PLAN 3
-Generalized weakness from poor oral intake; pt is also clinically dehydrated and has elevated BUN/Cr ratio. BP borderline.  -Will check urine lytes and osm  -s/p 2L in ED, giving maintenance as hyponatremia is likely from dehydration. Encourage PO intake  -Monitor BMP

## 2017-12-31 NOTE — H&P ADULT - PROBLEM SELECTOR PLAN 2
-Generalized weakness from poor oral intake; r/o malignancy given worsening GERD symptoms and weight loss.  -Check Iron panel and B12/folate level given decreased MCV/MCHC and h/o CHRISTIAN.  -May warrant non emergent EGD and/or CT of the abdomen

## 2017-12-31 NOTE — H&P ADULT - NSHPLABSRESULTS_GEN_ALL_CORE
LABS:                        12.3   9.7   )-----------( 311      ( 31 Dec 2017 02:49 )             41.9     12-31    127<L>  |  88<L>  |  39<H>  ----------------------------<  103<H>  4.2   |  29  |  1.23    Ca    9.9      31 Dec 2017 02:49    TPro  8.9<H>  /  Alb  3.2<L>  /  TBili  0.5  /  DBili  x   /  AST  45<H>  /  ALT  35  /  AlkPhos  77  12-31        CAPILLARY BLOOD GLUCOSE        LIVER FUNCTIONS - ( 31 Dec 2017 02:49 )  Alb: 3.2 g/dL / Pro: 8.9 g/dL / ALK PHOS: 77 U/L / ALT: 35 U/L DA / AST: 45 U/L / GGT: x             CARDIAC MARKERS ( 31 Dec 2017 02:49 )  0.026 ng/mL / x     / x     / x     / x    < from: CT Head No Cont (12.31.17 @ 03:13) >    IMPRESSION:     No acute intracranial hemorrhage or mass effect.    If clinical symptoms persist or worsen, more sensitive evaluation with   brain MRI may be obtained, if no contraindications exist.    < end of copied text >

## 2017-12-31 NOTE — ED ADULT NURSE NOTE - ED STAT RN HANDOFF DETAILS 2
Endorsed to RUI wiley, remains alert, responsive, resting in stretcher, on tele box J. Awaiting inpatient bed. Patient tolerated meal well. Family at bedside.

## 2017-12-31 NOTE — ED ADULT NURSE NOTE - NS ED NURSE LEVEL OF CONSCIOUSNESS AFFECT
Charissa Garcia is a 4year old male who was brought in for this visit. History was provided by the mom. HPI:   Patient presents with:  Cough      Patient with cough and congestion for 4 days. No fever. Used motrin a few times per day.   Used zarbee's with
Anxious

## 2018-01-01 ENCOUNTER — TRANSCRIPTION ENCOUNTER (OUTPATIENT)
Age: 63
End: 2018-01-01

## 2018-01-01 DIAGNOSIS — G45.9 TRANSIENT CEREBRAL ISCHEMIC ATTACK, UNSPECIFIED: ICD-10-CM

## 2018-01-01 LAB
ANION GAP SERPL CALC-SCNC: 10 MMOL/L — SIGNIFICANT CHANGE UP (ref 5–17)
BUN SERPL-MCNC: 25 MG/DL — HIGH (ref 7–18)
CALCIUM SERPL-MCNC: 9.6 MG/DL — SIGNIFICANT CHANGE UP (ref 8.4–10.5)
CHLORIDE SERPL-SCNC: 104 MMOL/L — SIGNIFICANT CHANGE UP (ref 96–108)
CHOLEST SERPL-MCNC: 138 MG/DL — SIGNIFICANT CHANGE UP (ref 10–199)
CK MB BLD-MCNC: 2.7 % — SIGNIFICANT CHANGE UP (ref 0–3.5)
CK MB CFR SERPL CALC: 2.4 NG/ML — SIGNIFICANT CHANGE UP (ref 0–3.6)
CK SERPL-CCNC: 90 U/L — SIGNIFICANT CHANGE UP (ref 21–215)
CO2 SERPL-SCNC: 24 MMOL/L — SIGNIFICANT CHANGE UP (ref 22–31)
CREAT SERPL-MCNC: 0.95 MG/DL — SIGNIFICANT CHANGE UP (ref 0.5–1.3)
GLUCOSE BLDC GLUCOMTR-MCNC: 127 MG/DL — HIGH (ref 70–99)
GLUCOSE BLDC GLUCOMTR-MCNC: 190 MG/DL — HIGH (ref 70–99)
GLUCOSE BLDC GLUCOMTR-MCNC: 193 MG/DL — HIGH (ref 70–99)
GLUCOSE BLDC GLUCOMTR-MCNC: 95 MG/DL — SIGNIFICANT CHANGE UP (ref 70–99)
GLUCOSE SERPL-MCNC: 146 MG/DL — HIGH (ref 70–99)
HAV IGM SER-ACNC: SIGNIFICANT CHANGE UP
HBV CORE IGM SER-ACNC: SIGNIFICANT CHANGE UP
HBV SURFACE AG SER-ACNC: SIGNIFICANT CHANGE UP
HCT VFR BLD CALC: 43.8 % — SIGNIFICANT CHANGE UP (ref 34.5–45)
HCV AB S/CO SERPL IA: 0.21 S/CO — SIGNIFICANT CHANGE UP
HCV AB SERPL-IMP: SIGNIFICANT CHANGE UP
HDLC SERPL-MCNC: 49 MG/DL — SIGNIFICANT CHANGE UP (ref 40–125)
HGB BLD-MCNC: 12.8 G/DL — SIGNIFICANT CHANGE UP (ref 11.5–15.5)
LIPID PNL WITH DIRECT LDL SERPL: 69 MG/DL — SIGNIFICANT CHANGE UP
MAGNESIUM SERPL-MCNC: 2.1 MG/DL — SIGNIFICANT CHANGE UP (ref 1.6–2.6)
MCHC RBC-ENTMCNC: 22.2 PG — LOW (ref 27–34)
MCHC RBC-ENTMCNC: 29.3 GM/DL — LOW (ref 32–36)
MCV RBC AUTO: 75.7 FL — LOW (ref 80–100)
PHOSPHATE SERPL-MCNC: 2.2 MG/DL — LOW (ref 2.5–4.5)
PLATELET # BLD AUTO: 344 K/UL — SIGNIFICANT CHANGE UP (ref 150–400)
POTASSIUM SERPL-MCNC: 3.3 MMOL/L — LOW (ref 3.5–5.3)
POTASSIUM SERPL-SCNC: 3.3 MMOL/L — LOW (ref 3.5–5.3)
RBC # BLD: 5.79 M/UL — HIGH (ref 3.8–5.2)
RBC # FLD: 14.4 % — SIGNIFICANT CHANGE UP (ref 10.3–14.5)
SODIUM SERPL-SCNC: 138 MMOL/L — SIGNIFICANT CHANGE UP (ref 135–145)
TOTAL CHOLESTEROL/HDL RATIO MEASUREMENT: 2.8 RATIO — LOW (ref 3.3–7.1)
TRIGL SERPL-MCNC: 102 MG/DL — SIGNIFICANT CHANGE UP (ref 10–149)
TROPONIN I SERPL-MCNC: <0.015 NG/ML — SIGNIFICANT CHANGE UP (ref 0–0.04)
WBC # BLD: 6 K/UL — SIGNIFICANT CHANGE UP (ref 3.8–10.5)
WBC # FLD AUTO: 6 K/UL — SIGNIFICANT CHANGE UP (ref 3.8–10.5)

## 2018-01-01 RX ORDER — SODIUM CHLORIDE 9 MG/ML
1000 INJECTION INTRAMUSCULAR; INTRAVENOUS; SUBCUTANEOUS
Qty: 0 | Refills: 0 | Status: COMPLETED | OUTPATIENT
Start: 2018-01-01 | End: 2018-01-01

## 2018-01-01 RX ORDER — CEFTRIAXONE 500 MG/1
1 INJECTION, POWDER, FOR SOLUTION INTRAMUSCULAR; INTRAVENOUS EVERY 24 HOURS
Qty: 0 | Refills: 0 | Status: DISCONTINUED | OUTPATIENT
Start: 2018-01-02 | End: 2018-01-03

## 2018-01-01 RX ORDER — CEFTRIAXONE 500 MG/1
1 INJECTION, POWDER, FOR SOLUTION INTRAMUSCULAR; INTRAVENOUS ONCE
Qty: 0 | Refills: 0 | Status: COMPLETED | OUTPATIENT
Start: 2018-01-01 | End: 2018-01-01

## 2018-01-01 RX ORDER — CEFTRIAXONE 500 MG/1
INJECTION, POWDER, FOR SOLUTION INTRAMUSCULAR; INTRAVENOUS
Qty: 0 | Refills: 0 | Status: DISCONTINUED | OUTPATIENT
Start: 2018-01-01 | End: 2018-01-03

## 2018-01-01 RX ADMIN — SODIUM CHLORIDE 75 MILLILITER(S): 9 INJECTION INTRAMUSCULAR; INTRAVENOUS; SUBCUTANEOUS at 06:51

## 2018-01-01 RX ADMIN — ATORVASTATIN CALCIUM 40 MILLIGRAM(S): 80 TABLET, FILM COATED ORAL at 21:49

## 2018-01-01 RX ADMIN — PANTOPRAZOLE SODIUM 40 MILLIGRAM(S): 20 TABLET, DELAYED RELEASE ORAL at 06:51

## 2018-01-01 RX ADMIN — CEFTRIAXONE 100 GRAM(S): 500 INJECTION, POWDER, FOR SOLUTION INTRAMUSCULAR; INTRAVENOUS at 11:18

## 2018-01-01 RX ADMIN — SODIUM CHLORIDE 100 MILLILITER(S): 9 INJECTION INTRAMUSCULAR; INTRAVENOUS; SUBCUTANEOUS at 11:18

## 2018-01-01 RX ADMIN — Medication 1: at 21:49

## 2018-01-01 RX ADMIN — PANTOPRAZOLE SODIUM 40 MILLIGRAM(S): 20 TABLET, DELAYED RELEASE ORAL at 17:09

## 2018-01-01 RX ADMIN — Medication 1: at 17:08

## 2018-01-01 NOTE — DISCHARGE NOTE ADULT - MEDICATION SUMMARY - MEDICATIONS TO TAKE
I will START or STAY ON the medications listed below when I get home from the hospital:    aspirin 81 mg oral delayed release tablet  -- 1 tab(s) by mouth once a day  -- Indication: For Carotid artery stenosis    fluconazole 200 mg oral tablet  -- 1 tab(s) by mouth once a day  -- Indication: For Esophageal candidiasis    atorvastatin 40 mg oral tablet  -- 1 tab(s) by mouth once a day (at bedtime)  -- Indication: For Carotid artery stenosis    pantoprazole 40 mg oral delayed release tablet  -- 1 tab(s) by mouth once a day   -- Indication: For GERD (gastroesophageal reflux disease) I will START or STAY ON the medications listed below when I get home from the hospital:    aspirin 81 mg oral delayed release tablet  -- 1 tab(s) by mouth once a day  -- Indication: For Cerebrovascular accident (CVA), unspecified mechanism    fluconazole 200 mg oral tablet  -- 1 tab(s) by mouth once a day  -- Indication: For infection    atorvastatin 40 mg oral tablet  -- 1 tab(s) by mouth once a day (at bedtime)  -- Indication: For Cerebrovascular accident (CVA), unspecified mechanism    pantoprazole 40 mg oral delayed release tablet  -- 1 tab(s) by mouth once a day   -- Indication: For GERD (gastroesophageal reflux disease)

## 2018-01-01 NOTE — PROGRESS NOTE ADULT - SUBJECTIVE AND OBJECTIVE BOX
PGY 1 Note discussed with supervising resident and primary attending    Patient is a 62y old  Female who presents with a chief complaint of Left side arm weakness onset yesterday morning (31 Dec 2017 09:13)      INTERVAL HPI/OVERNIGHT EVENTS: patient examined at bedside. He/She has no complaints and current symptoms are resolving    Overnight events on telemetry monitoring []    MEDICATIONS  (STANDING):  atorvastatin 40 milliGRAM(s) Oral at bedtime  enoxaparin Injectable 40 milliGRAM(s) SubCutaneous daily  insulin lispro (HumaLOG) corrective regimen sliding scale   SubCutaneous Before meals and at bedtime  pantoprazole    Tablet 40 milliGRAM(s) Oral two times a day before meals  sodium chloride 0.9%. 1000 milliLiter(s) (75 mL/Hr) IV Continuous <Continuous>    MEDICATIONS  (PRN):    _______________________________________________  REVIEW OF SYSTEMS:  CONSTITUTIONAL: No fever  NECK: No pain or stiffness  RESPIRATORY: No cough; No shortness of breath  CARDIOVASCULAR: No chest pain, no palpitations  GASTROINTESTINAL: No pain. No constipation, nausea or vomiting; No diarrhea   NEUROLOGICAL: No headache or numbness, no tremors  MUSCULOSKELETAL: No joint pain, no muscle pain  GENITOURINARY: no dysuria, no frequency, no hesitancy  PSYCHIATRY: no depression , no anxiety    Vital Signs Last 24 Hrs  T(C): 36.9 (2018 02:59), Max: 37 (31 Dec 2017 07:33)  T(F): 98.4 (2018 02:59), Max: 98.6 (31 Dec 2017 07:33)  HR: 82 (2018 02:59) (65 - 82)  BP: 99/53 (2018 02:59) (93/61 - 102/54)  BP(mean): --  RR: 16 (2018 02:59) (14 - 161)  SpO2: 98% (2018 02:59) (98% - 100%)  ________________________________________________  PHYSICAL EXAM:  GENERAL: NAD, lying in bed  HEENT: Normocephalic;  conjunctivae and sclerae clear; moist mucous membranes  NECK : supple, trachea midline, no JVD  CHEST/LUNG: Clear to auscultation bilaterally with good air entry   HEART: S1 S2,  regular rate and rhythm,; no murmurs  ABDOMEN: Soft, Nontender, Nondistended; Bowel sounds present  EXTREMITIES: no cyanosis; no edema; no calf tenderness  SKIN: no rash  NERVOUS SYSTEM:  AAOx3; no new focal deficits  _________________________________________________  LABS:                        10.3   5.7   )-----------( 258      ( 31 Dec 2017 10:35 )             34.3         132<L>  |  95<L>  |  32<H>  ----------------------------<  115<H>  3.7   |  29  |  0.84    Ca    9.2      31 Dec 2017 10:35  Phos  1.4       Mg     2.1         TPro  8.9<H>  /  Alb  3.2<L>  /  TBili  0.5  /  DBili  x   /  AST  45<H>  /  ALT  35  /  AlkPhos  77      PT/INR - ( 31 Dec 2017 10:35 )   PT: 12.5 sec;   INR: 1.14 ratio         PTT - ( 31 Dec 2017 10:35 )  PTT:27.0 sec  Urinalysis Basic - ( 31 Dec 2017 19:36 )    Color: Yellow / Appearance: Clear / S.015 / pH: x  Gluc: x / Ketone: Small  / Bili: Negative / Urobili: 1   Blood: x / Protein: 30 mg/dL / Nitrite: Negative   Leuk Esterase: Small / RBC: 2-5 /HPF / WBC 6-10 /HPF   Sq Epi: x / Non Sq Epi: Few /HPF / Bacteria: TNTC /HPF      CAPILLARY BLOOD GLUCOSE      POCT Blood Glucose.: 194 mg/dL (31 Dec 2017 22:25)  POCT Blood Glucose.: 88 mg/dL (31 Dec 2017 18:08)  POCT Blood Glucose.: 109 mg/dL (31 Dec 2017 11:47)    RADIOLOGY & ADDITIONAL TESTS:    Consultant(s) Notes Reviewed:   YES    Care Discussed with Consultants:   Infectious Disease [] Endocrinology [] Neurology [x] ENT [] Cardiology [] Electrophysiology [] Pulmonology [] Gastroenterology [] Nephrology [] Urology [] Orthopaedics [] Vascular Surgery [] Thoracic Surgery [] Plastic Surgery [] General Surgery [] Podiatry [] Psychiatry [] Hematology/Oncology [] Pain [] Palliative Care []    Plan of care was discussed with patient and/or primary care giver; all questions and concerns were addressed and care was aligned with patient's wishes. PGY 1 Note discussed with supervising resident and primary attending    Patient is a 62y old  Female who presents with a chief complaint of Left side arm weakness onset yesterday morning (31 Dec 2017 09:13)      INTERVAL HPI/OVERNIGHT EVENTS: patient examined at bedside. Patient is still co LUE weakness, but has no other complaints. Her symptoms of reflux have improved, but she is requesting to undergo an endoscopy during this admission.      Overnight events on telemetry monitoring []    MEDICATIONS  (STANDING):  atorvastatin 40 milliGRAM(s) Oral at bedtime  enoxaparin Injectable 40 milliGRAM(s) SubCutaneous daily  insulin lispro (HumaLOG) corrective regimen sliding scale   SubCutaneous Before meals and at bedtime  pantoprazole    Tablet 40 milliGRAM(s) Oral two times a day before meals  sodium chloride 0.9%. 1000 milliLiter(s) (75 mL/Hr) IV Continuous <Continuous>    MEDICATIONS  (PRN):    _______________________________________________  REVIEW OF SYSTEMS:  CONSTITUTIONAL: No fever  RESPIRATORY: No cough; No shortness of breath  CARDIOVASCULAR: No chest pain, no palpitations  GASTROINTESTINAL: Reports reflux. No constipation, nausea or vomiting; No diarrhea   NEUROLOGICAL: No headache or numbness, no tremors. Reports LUE joint weakness  MUSCULOSKELETAL: No joint pain, no muscle pain    Vital Signs Last 24 Hrs  T(C): 36.9 (2018 02:59), Max: 37 (31 Dec 2017 07:33)  T(F): 98.4 (2018 02:59), Max: 98.6 (31 Dec 2017 07:33)  HR: 82 (2018 02:59) (65 - 82)  BP: 99/53 (2018 02:59) (93/61 - 102/54)  BP(mean): --  RR: 16 (2018 02:59) (14 - 161)  SpO2: 98% (2018 02:59) (98% - 100%)  ________________________________________________  PHYSICAL EXAM:  GENERAL: NAD, lying in bed  CHEST/LUNG: Clear to auscultation bilaterally with good air entry   HEART: S1 S2,  regular rate and rhythm,; no murmurs  ABDOMEN: Soft, Nontender, Nondistended; Bowel sounds present  EXTREMITIES: no cyanosis; no edema; no calf tenderness  NERVOUS SYSTEM:  AAOx3; 5/5 strength in B/L UEs and LEs. No focal deficits   _________________________________________________  LABS:                        10.3   5.7   )-----------( 258      ( 31 Dec 2017 10:35 )             34.3         132<L>  |  95<L>  |  32<H>  ----------------------------<  115<H>  3.7   |  29  |  0.84    Ca    9.2      31 Dec 2017 10:35  Phos  1.4       Mg     2.1         TPro  8.9<H>  /  Alb  3.2<L>  /  TBili  0.5  /  DBili  x   /  AST  45<H>  /  ALT  35  /  AlkPhos  77      PT/INR - ( 31 Dec 2017 10:35 )   PT: 12.5 sec;   INR: 1.14 ratio         PTT - ( 31 Dec 2017 10:35 )  PTT:27.0 sec  Urinalysis Basic - ( 31 Dec 2017 19:36 )    Color: Yellow / Appearance: Clear / S.015 / pH: x  Gluc: x / Ketone: Small  / Bili: Negative / Urobili: 1   Blood: x / Protein: 30 mg/dL / Nitrite: Negative   Leuk Esterase: Small / RBC: 2-5 /HPF / WBC 6-10 /HPF   Sq Epi: x / Non Sq Epi: Few /HPF / Bacteria: TNTC /HPF      CAPILLARY BLOOD GLUCOSE      POCT Blood Glucose.: 194 mg/dL (31 Dec 2017 22:25)  POCT Blood Glucose.: 88 mg/dL (31 Dec 2017 18:08)  POCT Blood Glucose.: 109 mg/dL (31 Dec 2017 11:47)    RADIOLOGY & ADDITIONAL TESTS:    Consultant(s) Notes Reviewed:   YES    Care Discussed with Consultants:   Infectious Disease [] Endocrinology [] Neurology [x] ENT [] Cardiology [] Electrophysiology [] Pulmonology [] Gastroenterology [] Nephrology [] Urology [] Orthopaedics [] Vascular Surgery [] Thoracic Surgery [] Plastic Surgery [] General Surgery [] Podiatry [] Psychiatry [] Hematology/Oncology [] Pain [] Palliative Care []    Plan of care was discussed with patient and/or primary care giver; all questions and concerns were addressed and care was aligned with patient's wishes.

## 2018-01-01 NOTE — DISCHARGE NOTE ADULT - HOSPITAL COURSE
62F admitted for assessment for TIA and hyponatermia, likely secondary to decreased PO intake    PMHx DM II, Fe deficiency anemia, GERD, and PSH of bowel ileus s/p laparotomy, s/p hysterectomy,     Background – came to ED c/o left side arm weakness that began morning before admission. Patient reports that s/s lasted for a few hours before spontaneously resolving, but he also had been experiencing worsening GERD and nausea with 10lb weight loss and decreased PO intake. Patient last saw PMD in 8/2017 where her A1c was 6.6 – she stopped Glimperidine and swiched to diet control. Prior EGD was 3 years ago and colonoscopy 5 years prior, both without significant pathology    ED – vitals stable  Labs significant for Na 127, BUN/Cr 39/1.23, Hgb 12.3  EKG NSR; Troponin  CT head negative, CT cervical spine revealed no spinal pathology; however, dilation of proximal esophagus.     On the floors, patient underwent additional CT imaging of her chest, abdomen, and pelvis due to the abnormality found on CT imaging of her neck. Gastroenterology was also consulted     Given patient's improved clinical status and current hemodynamic stability, decision was made to discharge.  Please refer to patient's complete medical chart with documents for a full hospital course, for this is only a brief summary. 62F admitted for assessment for TIA and hyponatermia, likely secondary to decreased PO intake    PMHx DM II, Fe deficiency anemia, GERD, and PSH of bowel ileus s/p laparotomy, s/p hysterectomy,     Background – came to ED c/o left side arm weakness that began morning before admission. Patient reports that s/s lasted for a few hours before spontaneously resolving, but he also had been experiencing worsening GERD and nausea with 10lb weight loss and decreased PO intake. Patient last saw PMD in 8/2017 where her A1c was 6.6 – she stopped Glimperidine and swiched to diet control. Prior EGD was 3 years ago and colonoscopy 5 years prior, both without significant pathology    ED – vitals stable  Labs significant for Na 127, BUN/Cr 39/1.23, Hgb 12.3  EKG NSR; Troponin  CT head negative, CT cervical spine revealed no spinal pathology; however, dilation of proximal esophagus.     On the floors, patient underwent additional CT imaging of her chest, abdomen, and pelvis due to the abnormality found on CT imaging of her neck.   CT imaging revealed dilation of esophagus with accumulation of food suggestive of achalasia vs. pseudoachalasia from obstruction at GE junction, mild nodular densities in both upper lungs, 1.6cm density in RLL with stellate borders, and hypodense lesions in both kidneys   Renal US revealed a 1.8 cm cyst in the upper pole region of the right kidney, a 3.5 cm cyst is identified within the mid pole region of the right kidney, in addition to a 3.8 cm cyst is in the upper pole region of the left kidney.  Patient underwent EGD 1/4/18 which revealed esophageal candidiasis and no masses. Gastroenterology felt as if patient's findings were more consistent with achalasia and recommended for patient to be switched to full liquid diet with aspiration precautions, and outpatient referral for manometry and potential surgical myotomy based upon results. Patient was also started on fluconazole 200mg daily.   For patient's abnormal CT findings, hematology oncology was consulted - they recommended outpatient PET scan to be routinely performed.Patient will be discharged with fluconazole and instructions to follow up with outpatient gastroenterologists       Given patient's improved clinical status and current hemodynamic stability, decision was made to discharge.  Please refer to patient's complete medical chart with documents for a full hospital course, for this is only a brief summary. 62F admitted for assessment for TIA and hyponatermia, likely secondary to decreased PO intake    PMHx DM II, Fe deficiency anemia, GERD, and PSH of bowel ileus s/p laparotomy, s/p hysterectomy,     Background – came to ED c/o left side arm weakness that began morning before admission. Patient reports that s/s lasted for a few hours before spontaneously resolving, but he also had been experiencing worsening GERD and nausea with 10lb weight loss and decreased PO intake. Patient last saw PMD in 8/2017 where her A1c was 6.6 – she stopped Glimperidine and swiched to diet control. Prior EGD was 3 years ago and colonoscopy 5 years prior, both without significant pathology    ED – vitals stable  Labs significant for Na 127, BUN/Cr 39/1.23, Hgb 12.3  EKG NSR; Troponin  CT head negative, CT cervical spine revealed no spinal pathology; however, dilation of proximal esophagus.     On the floors, patient's TIA symptoms required additional testing as per neurology - specifically MRI, which revealed no evidence of ischemia, in addition to carotid doppler testing, which showed no stenosis.  She also underwent additional CT imaging of her chest, abdomen, and pelvis due to the abnormality found on CT imaging of her neck.   CT imaging revealed dilation of esophagus with accumulation of food suggestive of achalasia vs. pseudoachalasia from obstruction at GE junction, mild nodular densities in both upper lungs, 1.6cm density in RLL with stellate borders, and hypodense lesions in both kidneys   Renal US revealed a 1.8 cm cyst in the upper pole region of the right kidney, a 3.5 cm cyst is identified within the mid pole region of the right kidney, in addition to a 3.8 cm cyst is in the upper pole region of the left kidney.  Patient underwent EGD 1/4/18 which revealed esophageal candidiasis and no masses. Gastroenterology felt as if patient's findings were more consistent with achalasia and recommended for patient to be switched to full liquid diet with aspiration precautions, and outpatient referral for manometry and potential surgical myotomy based upon results. Patient was also started on fluconazole 200mg daily.   For patient's abnormal CT findings, hematology oncology was consulted - they recommended outpatient PET scan to be routinely performed.Patient will be discharged with fluconazole and instructions to follow up with outpatient gastroenterologists       Given patient's improved clinical status and current hemodynamic stability, decision was made to discharge.  Please refer to patient's complete medical chart with documents for a full hospital course, for this is only a brief summary.

## 2018-01-01 NOTE — DISCHARGE NOTE ADULT - SECONDARY DIAGNOSIS.
Diabetes GERD (gastroesophageal reflux disease) TIA (transient ischemic attack) Esophageal candidiasis Abnormal CT scan

## 2018-01-01 NOTE — DISCHARGE NOTE ADULT - CARE PLAN
Principal Discharge DX:	Weight loss  Goal:	Follow up with outpatient gastroenterologist  Instructions for follow-up, activity and diet:	Your CT scan that was performed early during your admission revealed enlargement of your proximal esophagus. We initially started you on protonix and had you evaluated by our gastroenterologist. He recommended (GASTRO)  Secondary Diagnosis:	Diabetes  Instructions for follow-up, activity and diet:	Your Hemoglobin A1c was found to be [] during your admission. This means that [you are diabetic] [your diabetes is not well controlled] [your diabetes is well controlled]. Continue with your current medications as described in your discharge note and try to adhere to a diet without excessive intake of carbohydrates and sugar and perform exercise appropriate to your physical status. Follow up with your primary care provider within 1 month of discharge for further recommendation and monitoring. Consider repeating your Hemoglobin A1c within 3 months after discharge to monitor your average blood glucose control  Secondary Diagnosis:	GERD (gastroesophageal reflux disease)  Instructions for follow-up, activity and diet:	Continue with your current dose of protonix  Secondary Diagnosis:	TIA (transient ischemic attack)  Instructions for follow-up, activity and diet:	You were admitted with concern for a transient ischemic attack - due to your symptoms of LUE weakness. Your initial CT imaging of your brain and cervical spine were negative for any neurologic abnormalities, and given your paucity of neurologic findings, we did not pursue any additional testing.  Please follow up with your outpatient primary care provider within 1 month of discharge for additional recommendations and continued monitoring. Principal Discharge DX:	Weight loss  Goal:	Follow up with outpatient gastroenterologist  Instructions for follow-up, activity and diet:	Your CT scan that was performed early during your admission revealed enlargement of your proximal esophagus. We initially started you on protonix and had you evaluated by our gastroenterologist. He recommended that you undergo EGD - this testing revealed the presence of increased tension of your lower esophageal sphincter consistent with achalasia. Please follow up with Dr. Aguilera as an outpatient for further testing and treatment including esophageal manometry and potential surgical myotomy.  Secondary Diagnosis:	Diabetes  Instructions for follow-up, activity and diet:	Your Hemoglobin A1c was found to be [6.7] during your admission. This means that your diabetes is well controlled]. Continue with your current medications as described in your discharge note and try to adhere to a diet without excessive intake of carbohydrates and sugar and perform exercise appropriate to your physical status. Follow up with your primary care provider within 1 month of discharge for further recommendation and monitoring. Consider repeating your Hemoglobin A1c within 3 months after discharge to monitor your average blood glucose control  Secondary Diagnosis:	GERD (gastroesophageal reflux disease)  Instructions for follow-up, activity and diet:	Continue with your current dose of protonix  Secondary Diagnosis:	TIA (transient ischemic attack)  Instructions for follow-up, activity and diet:	You were admitted with concern for a transient ischemic attack - due to your symptoms of LUE weakness. Your initial CT imaging of your brain and cervical spine were negative for any neurologic abnormalities. however, we performed additional imaging including MRI and carotid doppler testing. Your MRI did not reveal any acute ischemia; however, your carotid artery doppler showed the presence of moderate (50-69%) stenosis of your right internal carotid. For this, please take your atrovastatin and aspirin as prescribed.   Please follow up with your outpatient primary care provider within 1 month of discharge for additional recommendations and continued monitoring.  Secondary Diagnosis:	Esophageal candidiasis  Instructions for follow-up, activity and diet:	Continue with fluconazole for 13 more days.  Secondary Diagnosis:	Abnormal CT scan  Instructions for follow-up, activity and diet:	CT imaging revealed dilation of esophagus with accumulation of food suggestive of achalasia vs. pseudoachalasia from obstruction at GE junction, mild nodular densities in both upper lungs, 1.6cm density in RLL with stellate borders, and hypodense lesions in both kidneys   Renal US revealed a 1.8 cm cyst in the upper pole region of the right kidney, a 3.5 cm cyst is identified within the mid pole region of the right kidney, in addition to a 3.8 cm cyst is in the upper pole region of the left kidney.   Our hematologist oncologist evaluated you and recommends for you to undergo PET scan imaging for your lung within 3 months, in addition to renal ultrasound within 6 months for these findings.

## 2018-01-01 NOTE — DISCHARGE NOTE ADULT - PLAN OF CARE
Follow up with outpatient gastroenterologist Your CT scan that was performed early during your admission revealed enlargement of your proximal esophagus. We initially started you on protonix and had you evaluated by our gastroenterologist. He recommended (GASTRO) Your Hemoglobin A1c was found to be [] during your admission. This means that [you are diabetic] [your diabetes is not well controlled] [your diabetes is well controlled]. Continue with your current medications as described in your discharge note and try to adhere to a diet without excessive intake of carbohydrates and sugar and perform exercise appropriate to your physical status. Follow up with your primary care provider within 1 month of discharge for further recommendation and monitoring. Consider repeating your Hemoglobin A1c within 3 months after discharge to monitor your average blood glucose control Continue with your current dose of protonix You were admitted with concern for a transient ischemic attack - due to your symptoms of LUE weakness. Your initial CT imaging of your brain and cervical spine were negative for any neurologic abnormalities, and given your paucity of neurologic findings, we did not pursue any additional testing.  Please follow up with your outpatient primary care provider within 1 month of discharge for additional recommendations and continued monitoring. Your CT scan that was performed early during your admission revealed enlargement of your proximal esophagus. We initially started you on protonix and had you evaluated by our gastroenterologist. He recommended that you undergo EGD - this testing revealed the presence of increased tension of your lower esophageal sphincter consistent with achalasia. Please follow up with Dr. Aguilera as an outpatient for further testing and treatment including esophageal manometry and potential surgical myotomy. Your Hemoglobin A1c was found to be [6.7] during your admission. This means that your diabetes is well controlled]. Continue with your current medications as described in your discharge note and try to adhere to a diet without excessive intake of carbohydrates and sugar and perform exercise appropriate to your physical status. Follow up with your primary care provider within 1 month of discharge for further recommendation and monitoring. Consider repeating your Hemoglobin A1c within 3 months after discharge to monitor your average blood glucose control You were admitted with concern for a transient ischemic attack - due to your symptoms of LUE weakness. Your initial CT imaging of your brain and cervical spine were negative for any neurologic abnormalities. however, we performed additional imaging including MRI and carotid doppler testing. Your MRI did not reveal any acute ischemia; however, your carotid artery doppler showed the presence of moderate (50-69%) stenosis of your right internal carotid. For this, please take your atrovastatin and aspirin as prescribed.   Please follow up with your outpatient primary care provider within 1 month of discharge for additional recommendations and continued monitoring. Continue with fluconazole for 13 more days. CT imaging revealed dilation of esophagus with accumulation of food suggestive of achalasia vs. pseudoachalasia from obstruction at GE junction, mild nodular densities in both upper lungs, 1.6cm density in RLL with stellate borders, and hypodense lesions in both kidneys   Renal US revealed a 1.8 cm cyst in the upper pole region of the right kidney, a 3.5 cm cyst is identified within the mid pole region of the right kidney, in addition to a 3.8 cm cyst is in the upper pole region of the left kidney.   Our hematologist oncologist evaluated you and recommends for you to undergo PET scan imaging for your lung within 3 months, in addition to renal ultrasound within 6 months for these findings.

## 2018-01-01 NOTE — PROGRESS NOTE ADULT - PROBLEM SELECTOR PLAN 1
Patient reported self-resolving LUE weakness  CT head negative for acute pathology  EKG NSR; Troponin negative  Continue with aspirin, statin  F/U TTE, Carotid Doppler, CT C-spine, MRI of head   Neurology Dr. Rendon following

## 2018-01-01 NOTE — PROGRESS NOTE ADULT - PROBLEM SELECTOR PLAN 3
Likely secondary to decreased PO intake  Continue to monitor BMP Continue with protonix 40mg BID  GI planning as above

## 2018-01-01 NOTE — PROGRESS NOTE ADULT - PROBLEM SELECTOR PLAN 5
Patient recently discontinued home dose of glimepiride  HSS and accuchecks  F/U a1c Patient recently discontinued home dose of glimepiride  HSS and accuchecks  A1c = 6.7 IMPROVE VTE score = 2 for age and immobilization  Lovenox for DVT chemoprophylaxis  Protonix for GI prophylaxis

## 2018-01-01 NOTE — DISCHARGE NOTE ADULT - CARE PROVIDER_API CALL
Chandan Aguilera), Gastroenterology; Internal Medicine  [address] [place] [address]  Phone: [phone]  Fax: [phone]

## 2018-01-01 NOTE — PROGRESS NOTE ADULT - PROBLEM SELECTOR PLAN 2
Patient reports 10lb weight loss with decreased PO intake and worsening GERD s/s  Last EGD 3 years prior, Colonoscopy 5 years prior  F/U B12, Folate Patient reports 10lb weight loss with decreased PO intake and worsening GERD s/s  Last EGD 3 years prior, Colonoscopy 5 years prior  Folate, B12 within normal limits   Patient is requesting EGD given worsening GERD symptoms although she recently started taking protonix after several month hiatus Patient reports 10lb weight loss with decreased PO intake and worsening GERD s/s  Last EGD 3 years prior, Colonoscopy 5 years prior  Folate, B12 within normal limits   CT scan revealed severe dilation of upper esophagus  F/U CT chest, abdomen, and pelvis with IV contrast  GI Dr. Aguilera consulted Patient reports 10lb weight loss with decreased PO intake and worsening GERD s/s  Last EGD 3 years prior, Colonoscopy 5 years prior  Folate, B12 within normal limits   CT scan revealed severe dilation of upper esophagus  F/U CT neck, chest, abdomen, and pelvis with IV contrast  GI Dr. Aguilera consulted

## 2018-01-01 NOTE — PROGRESS NOTE ADULT - PROBLEM SELECTOR PLAN 4
Continue with protonix 40mg BID Patient recently discontinued home dose of glimepiride  HSS and accuchecks  A1c = 6.7

## 2018-01-01 NOTE — PROGRESS NOTE ADULT - ASSESSMENT
62F PMHx DM II, Fe deficiency anemia, GERD, and PSH of bowel ileus s/p laparotomy, s/p hysterectomy admitted for assessment for TIA and hyponatermia, likely secondary to decreased PO intake

## 2018-01-02 LAB
% ALBUMIN: 43.5 % — SIGNIFICANT CHANGE UP
% ALPHA 1: 5.4 % — SIGNIFICANT CHANGE UP
% ALPHA 2: 10.7 % — SIGNIFICANT CHANGE UP
% BETA: 13.6 % — SIGNIFICANT CHANGE UP
% GAMMA: 26.8 % — SIGNIFICANT CHANGE UP
ALBUMIN SERPL ELPH-MCNC: 3 G/DL — LOW (ref 3.6–5.5)
ALBUMIN/GLOB SERPL ELPH: 0.8 RATIO — SIGNIFICANT CHANGE UP
ALPHA1 GLOB SERPL ELPH-MCNC: 0.4 G/DL — SIGNIFICANT CHANGE UP (ref 0.1–0.4)
ALPHA2 GLOB SERPL ELPH-MCNC: 0.7 G/DL — SIGNIFICANT CHANGE UP (ref 0.5–1)
ANION GAP SERPL CALC-SCNC: 8 MMOL/L — SIGNIFICANT CHANGE UP (ref 5–17)
B-GLOBULIN SERPL ELPH-MCNC: 1 G/DL — SIGNIFICANT CHANGE UP (ref 0.5–1)
BUN SERPL-MCNC: 22 MG/DL — HIGH (ref 7–18)
CALCIUM SERPL-MCNC: 8.5 MG/DL — SIGNIFICANT CHANGE UP (ref 8.4–10.5)
CHLORIDE SERPL-SCNC: 100 MMOL/L — SIGNIFICANT CHANGE UP (ref 96–108)
CO2 SERPL-SCNC: 27 MMOL/L — SIGNIFICANT CHANGE UP (ref 22–31)
CREAT SERPL-MCNC: 0.67 MG/DL — SIGNIFICANT CHANGE UP (ref 0.5–1.3)
GAMMA GLOBULIN: 1.9 G/DL — HIGH (ref 0.6–1.6)
GLUCOSE BLDC GLUCOMTR-MCNC: 111 MG/DL — HIGH (ref 70–99)
GLUCOSE BLDC GLUCOMTR-MCNC: 114 MG/DL — HIGH (ref 70–99)
GLUCOSE BLDC GLUCOMTR-MCNC: 237 MG/DL — HIGH (ref 70–99)
GLUCOSE BLDC GLUCOMTR-MCNC: 90 MG/DL — SIGNIFICANT CHANGE UP (ref 70–99)
GLUCOSE SERPL-MCNC: 116 MG/DL — HIGH (ref 70–99)
HCT VFR BLD CALC: 30.9 % — LOW (ref 34.5–45)
HCT VFR BLD CALC: 36.8 % — SIGNIFICANT CHANGE UP (ref 34.5–45)
HEMOGLOBIN INTERPRETATION: SIGNIFICANT CHANGE UP
HGB A MFR BLD: 97.7 % — SIGNIFICANT CHANGE UP (ref 95.8–98)
HGB A2 MFR BLD: 2.3 % — SIGNIFICANT CHANGE UP (ref 2–3.2)
HGB BLD-MCNC: 11.1 G/DL — LOW (ref 11.5–15.5)
HGB BLD-MCNC: 9.7 G/DL — LOW (ref 11.5–15.5)
INTERPRETATION SERPL IFE-IMP: SIGNIFICANT CHANGE UP
MCHC RBC-ENTMCNC: 23.2 PG — LOW (ref 27–34)
MCHC RBC-ENTMCNC: 24 PG — LOW (ref 27–34)
MCHC RBC-ENTMCNC: 30.3 GM/DL — LOW (ref 32–36)
MCHC RBC-ENTMCNC: 31.3 GM/DL — LOW (ref 32–36)
MCV RBC AUTO: 76.6 FL — LOW (ref 80–100)
MCV RBC AUTO: 76.7 FL — LOW (ref 80–100)
PLATELET # BLD AUTO: 208 K/UL — SIGNIFICANT CHANGE UP (ref 150–400)
PLATELET # BLD AUTO: 251 K/UL — SIGNIFICANT CHANGE UP (ref 150–400)
POTASSIUM SERPL-MCNC: 3.9 MMOL/L — SIGNIFICANT CHANGE UP (ref 3.5–5.3)
POTASSIUM SERPL-SCNC: 3.9 MMOL/L — SIGNIFICANT CHANGE UP (ref 3.5–5.3)
PROT PATTERN SERPL ELPH-IMP: SIGNIFICANT CHANGE UP
PROT SERPL-MCNC: 7 G/DL — SIGNIFICANT CHANGE UP (ref 6–8.3)
PROT SERPL-MCNC: 7 G/DL — SIGNIFICANT CHANGE UP (ref 6–8.3)
RBC # BLD: 4.03 M/UL — SIGNIFICANT CHANGE UP (ref 3.8–5.2)
RBC # BLD: 4.79 M/UL — SIGNIFICANT CHANGE UP (ref 3.8–5.2)
RBC # FLD: 14.7 % — HIGH (ref 10.3–14.5)
RBC # FLD: 14.7 % — HIGH (ref 10.3–14.5)
SODIUM SERPL-SCNC: 135 MMOL/L — SIGNIFICANT CHANGE UP (ref 135–145)
WBC # BLD: 4.2 K/UL — SIGNIFICANT CHANGE UP (ref 3.8–10.5)
WBC # BLD: 4.5 K/UL — SIGNIFICANT CHANGE UP (ref 3.8–10.5)
WBC # FLD AUTO: 4.2 K/UL — SIGNIFICANT CHANGE UP (ref 3.8–10.5)
WBC # FLD AUTO: 4.5 K/UL — SIGNIFICANT CHANGE UP (ref 3.8–10.5)

## 2018-01-02 PROCEDURE — 70491 CT SOFT TISSUE NECK W/DYE: CPT | Mod: 26

## 2018-01-02 PROCEDURE — 71260 CT THORAX DX C+: CPT | Mod: 26

## 2018-01-02 PROCEDURE — 74177 CT ABD & PELVIS W/CONTRAST: CPT | Mod: 26

## 2018-01-02 RX ORDER — BENZOCAINE AND MENTHOL 5; 1 G/100ML; G/100ML
1 LIQUID ORAL EVERY 6 HOURS
Qty: 0 | Refills: 0 | Status: DISCONTINUED | OUTPATIENT
Start: 2018-01-02 | End: 2018-01-05

## 2018-01-02 RX ADMIN — Medication 2: at 21:47

## 2018-01-02 RX ADMIN — CEFTRIAXONE 100 GRAM(S): 500 INJECTION, POWDER, FOR SOLUTION INTRAMUSCULAR; INTRAVENOUS at 10:36

## 2018-01-02 RX ADMIN — ATORVASTATIN CALCIUM 40 MILLIGRAM(S): 80 TABLET, FILM COATED ORAL at 21:48

## 2018-01-02 RX ADMIN — PANTOPRAZOLE SODIUM 40 MILLIGRAM(S): 20 TABLET, DELAYED RELEASE ORAL at 06:06

## 2018-01-02 NOTE — CHART NOTE - NSCHARTNOTEFT_GEN_A_CORE
Patient's baseline BP has been persistently ~ 85/45. Patient is asymptomatic at this point. If needed, will give patient IV NS bolus

## 2018-01-02 NOTE — PROGRESS NOTE ADULT - PROBLEM SELECTOR PLAN 2
Patient reports 10lb weight loss with decreased PO intake and worsening GERD s/s  Last EGD 3 years prior, Colonoscopy 5 years prior  Folate, B12 within normal limits   CT scan revealed severe dilation of upper esophagus  F/U CT neck, chest, abdomen, and pelvis with IV contrast  GI Dr. Aguilera consulted Patient reports 10lb weight loss with decreased PO intake and worsening GERD s/s  Last EGD 3 years prior, Colonoscopy 5 years prior  Folate, B12 within normal limits   CT scan revealed severe dilation of upper esophagus  CT imaging revealed dilation of esophagus with accumulation of foot suggestive of achalasia vs. pseudoachalasion from obstruction at GE junction, mild nodular densities in both upper lungs, 1.6cm density in RLL with stellate borders, and hypodense lesions in both kidneys   GI Dr. Aguilera consulted - anticipating EGD 1/4 Patient reports 10lb weight loss with decreased PO intake and worsening GERD s/s  Last EGD 3 years prior, Colonoscopy 5 years prior  Folate, B12 within normal limits   CT imaging revealed dilation of esophagus with accumulation of food suggestive of achalasia vs. pseudoachalasia from obstruction at GE junction, mild nodular densities in both upper lungs, 1.6cm density in RLL with stellate borders, and hypodense lesions in both kidneys   GI Dr. Aguilera consulted - anticipating EGD 1/4 Patient reports 10lb weight loss with decreased PO intake and worsening GERD s/s  Last EGD 3 years prior, Colonoscopy 5 years prior  Folate, B12 within normal limits   CT imaging revealed dilation of esophagus with accumulation of food suggestive of achalasia vs. pseudoachalasia from obstruction at GE junction, mild nodular densities in both upper lungs, 1.6cm density in RLL with stellate borders, and hypodense lesions in both kidneys   GI Dr. Aguilera consulted - anticipating EGD 1/4  Hematology Oncology Dr. Danielle consulted

## 2018-01-02 NOTE — CONSULT NOTE ADULT - ATTENDING COMMENTS
I was physically present for the key portions of the evaluation and management (E/M) service provided.  The patient was personally seen and examined at bedside.  I reviewed the resident's  H& P , ROS,  Exam, assessment and plan. VS and labs  were personally reviewed.   I agree with  the all of the above with the following additions:    Summary:  Continue PPI as prescribed   Plan for EGD on Thursday             Thank you for your consultation and allowing  me to participate in the care of your patients. If you have further questions please contact me at [phone].

## 2018-01-02 NOTE — CONSULT NOTE ADULT - SUBJECTIVE AND OBJECTIVE BOX
Patient is a 62y old  Female who presents with a chief complaint of Left side arm weakness  GI consult requested for     10lb weight loss with decreased PO intake and worsening GERD s/s, with dysphagia to solids  CT C spine showing: -Upper esophagus is severely patulous/distended and contains debris.  Last EGD 3 years ago, Colonoscopy 5 years ago        MEDICATIONS  (STANDING):  atorvastatin 40 milliGRAM(s) Oral at bedtime  enoxaparin Injectable 40 milliGRAM(s) SubCutaneous daily  insulin lispro (HumaLOG) corrective regimen sliding scale   SubCutaneous Before meals and at bedtime  pantoprazole    Tablet 40 milliGRAM(s) Oral two times a day before meals  sodium chloride 0.9%. 1000 milliLiter(s) (75 mL/Hr) IV Continuous <Continuous>    MEDICATIONS  (PRN):    _______________________________________________  REVIEW OF SYSTEMS:  CONSTITUTIONAL: No fever  RESPIRATORY: No cough; No shortness of breath  CARDIOVASCULAR: No chest pain, no palpitations  GASTROINTESTINAL: Reports reflux. No constipation, nausea or vomiting; No diarrhea   NEUROLOGICAL: No headache or numbness, no tremors. Reports LUE joint weakness  MUSCULOSKELETAL: No joint pain, no muscle pain    Vital Signs Last 24 Hrs  T(C): 36.9 (2018 02:59), Max: 37 (31 Dec 2017 07:33)  T(F): 98.4 (2018 02:59), Max: 98.6 (31 Dec 2017 07:33)  HR: 82 (2018 02:59) (65 - 82)  BP: 99/53 (2018 02:59) (93/61 - 102/54)  BP(mean): --  RR: 16 (2018 02:59) (14 - 161)  SpO2: 98% (2018 02:59) (98% - 100%)  ________________________________________________  PHYSICAL EXAM:  GENERAL: NAD, lying in bed  CHEST/LUNG: Clear to auscultation bilaterally with good air entry   HEART: S1 S2,  regular rate and rhythm,; no murmurs  ABDOMEN: Soft, Nontender, Nondistended; Bowel sounds present  EXTREMITIES: no cyanosis; no edema; no calf tenderness  NERVOUS SYSTEM:  AAOx3; 5/5 strength in B/L UEs and LEs. No focal deficits   _________________________________________________  LABS:                        10.3   5.7   )-----------( 258      ( 31 Dec 2017 10:35 )             34.3     12-    132<L>  |  95<L>  |  32<H>  ----------------------------<  115<H>  3.7   |  29  |  0.84    Ca    9.2      31 Dec 2017 10:35  Phos  1.4       Mg     2.1         TPro  8.9<H>  /  Alb  3.2<L>  /  TBili  0.5  /  DBili  x   /  AST  45<H>  /  ALT  35  /  AlkPhos  77  12-31    PT/INR - ( 31 Dec 2017 10:35 )   PT: 12.5 sec;   INR: 1.14 ratio         PTT - ( 31 Dec 2017 10:35 )  PTT:27.0 sec  Urinalysis Basic - ( 31 Dec 2017 19:36 )    Color: Yellow / Appearance: Clear / S.015 / pH: x  Gluc: x / Ketone: Small  / Bili: Negative / Urobili: 1   Blood: x / Protein: 30 mg/dL / Nitrite: Negative   Leuk Esterase: Small / RBC: 2-5 /HPF / WBC 6-10 /HPF   Sq Epi: x / Non Sq Epi: Few /HPF / Bacteria: TNTC /HPF Patient is a 62y old  Female who presents with a chief complaint of Left side arm weakness  GI consult requested for     10lb weight loss within the past week (unintentional) with decreased PO intake and worsening GERD s/s, with dysphagia to solids  Patient has had a h/o dysphagia to solids for many years and has been told that she has hiatal hernia, has been on protonix for many years  She already knows that she has dilated esophagus for >25 yrs and has tried diet modifications without much relief  her symptoms have been progressively worsening    CT C spine showing: -Upper esophagus is severely patulous/distended and contains debris.  Last EGD 3 years ago, Colonoscopy 5 years ago        MEDICATIONS  (STANDING):  atorvastatin 40 milliGRAM(s) Oral at bedtime  enoxaparin Injectable 40 milliGRAM(s) SubCutaneous daily  insulin lispro (HumaLOG) corrective regimen sliding scale   SubCutaneous Before meals and at bedtime  pantoprazole    Tablet 40 milliGRAM(s) Oral two times a day before meals  sodium chloride 0.9%. 1000 milliLiter(s) (75 mL/Hr) IV Continuous <Continuous>    MEDICATIONS  (PRN):    _______________________________________________  REVIEW OF SYSTEMS:  CONSTITUTIONAL: No fever  RESPIRATORY: No cough; No shortness of breath  CARDIOVASCULAR: No chest pain, no palpitations  GASTROINTESTINAL: Reports reflux. No constipation, nausea or vomiting; No diarrhea   NEUROLOGICAL: No headache or numbness, no tremors. Reports LUE joint weakness  MUSCULOSKELETAL: No joint pain, no muscle pain    Vital Signs Last 24 Hrs  T(C): 36.9 (2018 02:59), Max: 37 (31 Dec 2017 07:33)  T(F): 98.4 (2018 02:59), Max: 98.6 (31 Dec 2017 07:33)  HR: 82 (2018 02:59) (65 - 82)  BP: 99/53 (2018 02:59) (93/61 - 102/54)  BP(mean): --  RR: 16 (2018 02:59) (14 - 161)  SpO2: 98% (2018 02:59) (98% - 100%)  ________________________________________________  PHYSICAL EXAM:  GENERAL: NAD, lying in bed  CHEST/LUNG: Clear to auscultation bilaterally with good air entry   HEART: S1 S2,  regular rate and rhythm,; no murmurs  ABDOMEN: Soft, Nontender, Nondistended; Bowel sounds present  EXTREMITIES: no cyanosis; no edema; no calf tenderness  NERVOUS SYSTEM:  AAOx3; 5/5 strength in B/L UEs and LEs. No focal deficits   _________________________________________________  LABS:                        10.3   5.7   )-----------( 258      ( 31 Dec 2017 10:35 )             34.3         132<L>  |  95<L>  |  32<H>  ----------------------------<  115<H>  3.7   |  29  |  0.84    Ca    9.2      31 Dec 2017 10:35  Phos  1.4       Mg     2.1         TPro  8.9<H>  /  Alb  3.2<L>  /  TBili  0.5  /  DBili  x   /  AST  45<H>  /  ALT  35  /  AlkPhos  77      PT/INR - ( 31 Dec 2017 10:35 )   PT: 12.5 sec;   INR: 1.14 ratio         PTT - ( 31 Dec 2017 10:35 )  PTT:27.0 sec  Urinalysis Basic - ( 31 Dec 2017 19:36 )    Color: Yellow / Appearance: Clear / S.015 / pH: x  Gluc: x / Ketone: Small  / Bili: Negative / Urobili: 1   Blood: x / Protein: 30 mg/dL / Nitrite: Negative   Leuk Esterase: Small / RBC: 2-5 /HPF / WBC 6-10 /HPF   Sq Epi: x / Non Sq Epi: Few /HPF / Bacteria: TNTC /HPF

## 2018-01-02 NOTE — PROGRESS NOTE ADULT - PROBLEM SELECTOR PLAN 5
IMPROVE VTE score = 2 for age and immobilization  Lovenox for DVT chemoprophylaxis  Protonix for GI prophylaxis

## 2018-01-02 NOTE — CHART NOTE - NSCHARTNOTEFT_GEN_A_CORE
Upon Nutritional Assessment by the Registered Dietitian your patient was determined to meet criteria / has evidence of the following diagnosis/diagnoses:          [ ]  Mild Protein Calorie Malnutrition        [ ]  Moderate Protein Calorie Malnutrition        [ X ] Severe Protein Calorie Malnutrition        [ ] Unspecified Protein Calorie Malnutrition        [ ] Underweight / BMI <19        [ ] Morbid Obesity / BMI > 40      Findings as based on:  •  Comprehensive nutrition assessment and consultation  •  Calorie counts (nutrient intake analysis)  •  Food acceptance and intake status from observations by staff  •  Follow up  •  Patient education  •  Intervention secondary to interdisciplinary rounds  •   concerns      Treatment:    The following diet has been recommended: Add Glucerna Shake 1can bid as medically feasible (440kcal, 20g protein)       PROVIDER Section:     By signing this assessment you are acknowledging and agree with the diagnosis/diagnoses assigned by the Registered Dietitian    Comments:

## 2018-01-02 NOTE — CONSULT NOTE ADULT - ASSESSMENT
Patient is a 62y old  Female with 10lbs weight loss, dysphagia, GERD like symptoms and severely patulous/distended esophagus seen on imaging and contains debris.    c/w protonix  CBC shows drop in H/H from 12.8 to 9.7, repeat CBC, no reported blood loss, ?dilutional Patient is a 62y old  Female with 10lbs weight loss, dysphagia, GERD like symptoms and severely patulous/distended esophagus seen on imaging and contains debris.    c/w protonix  Plan for EGD on thursday

## 2018-01-02 NOTE — DIETITIAN INITIAL EVALUATION ADULT. - OTHER INFO
nutrition consult requested for unintended wt loss; lives home PTA; skin intact; currently denied GI distress, chewing problem, tolerating 90% per flow sheet and observation present, food choices obtained, willing to try Glucerna Shake; h/o DM, stopped DM medication to control with diet

## 2018-01-02 NOTE — PROGRESS NOTE ADULT - SUBJECTIVE AND OBJECTIVE BOX
PGY 1 Note discussed with supervising resident and primary attending    Patient is a 62y old  Female who presents with a chief complaint of Left side arm weakness onset yesterday morning (2018 12:18)      INTERVAL HPI/OVERNIGHT EVENTS: patient examined at bedside. He/She has no complaints and current symptoms are resolving    Overnight events on telemetry monitoring []    MEDICATIONS  (STANDING):  atorvastatin 40 milliGRAM(s) Oral at bedtime  cefTRIAXone   IVPB 1 Gram(s) IV Intermittent every 24 hours  cefTRIAXone   IVPB      enoxaparin Injectable 40 milliGRAM(s) SubCutaneous daily  insulin lispro (HumaLOG) corrective regimen sliding scale   SubCutaneous Before meals and at bedtime  pantoprazole    Tablet 40 milliGRAM(s) Oral two times a day before meals    MEDICATIONS  (PRN):    _______________________________________________  REVIEW OF SYSTEMS:  CONSTITUTIONAL: No fever  NECK: No pain or stiffness  RESPIRATORY: No cough; No shortness of breath  CARDIOVASCULAR: No chest pain, no palpitations  GASTROINTESTINAL: No pain. No constipation, nausea or vomiting; No diarrhea   NEUROLOGICAL: No headache or numbness, no tremors  MUSCULOSKELETAL: No joint pain, no muscle pain  GENITOURINARY: no dysuria, no frequency, no hesitancy  PSYCHIATRY: no depression , no anxiety    Vital Signs Last 24 Hrs  T(C): 36.8 (2018 04:33), Max: 36.9 (2018 23:42)  T(F): 98.2 (2018 04:33), Max: 98.4 (2018 23:42)  HR: 102 (2018 04:33) (71 - 102)  BP: 95/52 (2018 04:33) (94/52 - 124/65)  BP(mean): --  RR: 16 (2018 04:33) (16 - 18)  SpO2: 100% (2018 04:33) (96% - 100%)  ________________________________________________  PHYSICAL EXAM:  GENERAL: NAD, lying in bed  HEENT: Normocephalic;  conjunctivae and sclerae clear; moist mucous membranes  NECK : supple, trachea midline, no JVD  CHEST/LUNG: Clear to auscultation bilaterally with good air entry   HEART: S1 S2,  regular rate and rhythm,; no murmurs  ABDOMEN: Soft, Nontender, Nondistended; Bowel sounds present  EXTREMITIES: no cyanosis; no edema; no calf tenderness  SKIN: no rash  NERVOUS SYSTEM:  AAOx3; no new focal deficits  _________________________________________________  LABS:                        12.8   6.0   )-----------( 344      ( 2018 10:51 )             43.8         138  |  104  |  25<H>  ----------------------------<  146<H>  3.3<L>   |  24  |  0.95    Ca    9.6      2018 10:51  Phos  2.2       Mg     2.1           PT/INR - ( 31 Dec 2017 10:35 )   PT: 12.5 sec;   INR: 1.14 ratio         PTT - ( 31 Dec 2017 10:35 )  PTT:27.0 sec  Urinalysis Basic - ( 31 Dec 2017 19:36 )    Color: Yellow / Appearance: Clear / S.015 / pH: x  Gluc: x / Ketone: Small  / Bili: Negative / Urobili: 1   Blood: x / Protein: 30 mg/dL / Nitrite: Negative   Leuk Esterase: Small / RBC: 2-5 /HPF / WBC 6-10 /HPF   Sq Epi: x / Non Sq Epi: Few /HPF / Bacteria: TNTC /HPF      CAPILLARY BLOOD GLUCOSE      POCT Blood Glucose.: 190 mg/dL (2018 21:23)  POCT Blood Glucose.: 193 mg/dL (2018 15:53)  POCT Blood Glucose.: 127 mg/dL (2018 11:08)  POCT Blood Glucose.: 95 mg/dL (2018 07:39)    RADIOLOGY & ADDITIONAL TESTS:    Consultant(s) Notes Reviewed:   YES    Care Discussed with Consultants:   Infectious Disease [] Endocrinology [] Neurology [x] ENT [] Cardiology [] Electrophysiology [] Pulmonology [] Gastroenterology [x] Nephrology [] Urology [] Orthopaedics [] Vascular Surgery [] Thoracic Surgery [] Plastic Surgery [] General Surgery [] Podiatry [] Psychiatry [] Hematology/Oncology [] Pain [] Palliative Care []    Plan of care was discussed with patient and/or primary care giver; all questions and concerns were addressed and care was aligned with patient's wishes. PGY 1 Note discussed with supervising resident and primary attending    Patient is a 62y old  Female who presents with a chief complaint of Left side arm weakness onset yesterday morning (2018 12:18)      INTERVAL HPI/OVERNIGHT EVENTS: patient examined at bedside. She reports mild throat pain and dysphagia, but has no other complaints other than "tenderness" to her left calf.     Overnight events on telemetry monitoring []    MEDICATIONS  (STANDING):  atorvastatin 40 milliGRAM(s) Oral at bedtime  cefTRIAXone   IVPB 1 Gram(s) IV Intermittent every 24 hours  cefTRIAXone   IVPB      enoxaparin Injectable 40 milliGRAM(s) SubCutaneous daily  insulin lispro (HumaLOG) corrective regimen sliding scale   SubCutaneous Before meals and at bedtime  pantoprazole    Tablet 40 milliGRAM(s) Oral two times a day before meals    MEDICATIONS  (PRN):    _______________________________________________  REVIEW OF SYSTEMS:  CONSTITUTIONAL: No fever  RESPIRATORY: No cough; No shortness of breath  CARDIOVASCULAR: No chest pain, no palpitations  GASTROINTESTINAL: No pain. No constipation, nausea or vomiting; No diarrhea. Reports dysphagia   NEUROLOGICAL: No headache or numbness, no tremors  MUSCULOSKELETAL: reports left sided calf pain    Vital Signs Last 24 Hrs  T(C): 36.8 (2018 04:33), Max: 36.9 (2018 23:42)  T(F): 98.2 (2018 04:33), Max: 98.4 (2018 23:42)  HR: 102 (2018 04:33) (71 - 102)  BP: 95/52 (2018 04:33) (94/52 - 124/65)  BP(mean): --  RR: 16 (2018 04:33) (16 - 18)  SpO2: 100% (2018 04:33) (96% - 100%)  ________________________________________________  PHYSICAL EXAM:  GENERAL: NAD, lying in bed  CHEST/LUNG: Clear to auscultation bilaterally with good air entry   HEART: S1 S2,  regular rate and rhythm,; no murmurs  ABDOMEN: Soft, Nontender, Nondistended; Bowel sounds present  EXTREMITIES: no cyanosis; no edema; left calf tenderness  NERVOUS SYSTEM:  AAOx3; no new focal deficits  _________________________________________________  LABS:                        12.8   6.0   )-----------( 344      ( 2018 10:51 )             43.8         138  |  104  |  25<H>  ----------------------------<  146<H>  3.3<L>   |  24  |  0.95    Ca    9.6      2018 10:51  Phos  2.2       Mg     2.1           PT/INR - ( 31 Dec 2017 10:35 )   PT: 12.5 sec;   INR: 1.14 ratio         PTT - ( 31 Dec 2017 10:35 )  PTT:27.0 sec  Urinalysis Basic - ( 31 Dec 2017 19:36 )    Color: Yellow / Appearance: Clear / S.015 / pH: x  Gluc: x / Ketone: Small  / Bili: Negative / Urobili: 1   Blood: x / Protein: 30 mg/dL / Nitrite: Negative   Leuk Esterase: Small / RBC: 2-5 /HPF / WBC 6-10 /HPF   Sq Epi: x / Non Sq Epi: Few /HPF / Bacteria: TNTC /HPF      CAPILLARY BLOOD GLUCOSE      POCT Blood Glucose.: 190 mg/dL (2018 21:23)  POCT Blood Glucose.: 193 mg/dL (2018 15:53)  POCT Blood Glucose.: 127 mg/dL (2018 11:08)  POCT Blood Glucose.: 95 mg/dL (2018 07:39)    RADIOLOGY & ADDITIONAL TESTS:    Consultant(s) Notes Reviewed:   YES    Care Discussed with Consultants:   Infectious Disease [] Endocrinology [] Neurology [x] ENT [] Cardiology [] Electrophysiology [] Pulmonology [] Gastroenterology [x] Nephrology [] Urology [] Orthopaedics [] Vascular Surgery [] Thoracic Surgery [] Plastic Surgery [] General Surgery [] Podiatry [] Psychiatry [] Hematology/Oncology [] Pain [] Palliative Care []    Plan of care was discussed with patient and/or primary care giver; all questions and concerns were addressed and care was aligned with patient's wishes.

## 2018-01-02 NOTE — DIETITIAN INITIAL EVALUATION ADULT. - NS FNS WEIGHT USED FOR CALC
Ht=5' 4"    FYG=331 lb    Admission ah=056 lb (dehydrated upon admission)    current ty=318.9 lb   BMI=19.5/admission Ht=5' 4"    COY=554 lb    Admission jf=148 lb (dehydrated upon admission)    current wd=111.9 lb   BMI=19.5/current

## 2018-01-02 NOTE — DIETITIAN INITIAL EVALUATION ADULT. - SIGNS/SYMPTOMS
decreased intake with 8.8% wt loss x 1m decreased intake with 8.8% wt loss x 1m, muscle wasting and subcutaneous fat loss

## 2018-01-02 NOTE — DIETITIAN INITIAL EVALUATION ADULT. - NUTRITION INTERVENTION
Meals and Snack/Collaboration and Referral of Nutrition Care/Feeding Assistance/Medical Food Supplements Collaboration and Referral of Nutrition Care/Medical Food Supplements/Discharge and Transfer of Nutrition Care to New Setting/Feeding Assistance/Meals and Snack

## 2018-01-03 DIAGNOSIS — R91.1 SOLITARY PULMONARY NODULE: ICD-10-CM

## 2018-01-03 DIAGNOSIS — K22.8 OTHER SPECIFIED DISEASES OF ESOPHAGUS: ICD-10-CM

## 2018-01-03 LAB
GLUCOSE BLDC GLUCOMTR-MCNC: 108 MG/DL — HIGH (ref 70–99)
GLUCOSE BLDC GLUCOMTR-MCNC: 173 MG/DL — HIGH (ref 70–99)
GLUCOSE BLDC GLUCOMTR-MCNC: 212 MG/DL — HIGH (ref 70–99)
GLUCOSE BLDC GLUCOMTR-MCNC: 95 MG/DL — SIGNIFICANT CHANGE UP (ref 70–99)

## 2018-01-03 PROCEDURE — 99232 SBSQ HOSP IP/OBS MODERATE 35: CPT

## 2018-01-03 PROCEDURE — 99233 SBSQ HOSP IP/OBS HIGH 50: CPT

## 2018-01-03 PROCEDURE — 76775 US EXAM ABDO BACK WALL LIM: CPT | Mod: 26

## 2018-01-03 RX ORDER — COSYNTROPIN 0.25 MG/ML
0.25 INJECTION, SOLUTION INTRAVENOUS ONCE
Qty: 0 | Refills: 0 | Status: DISCONTINUED | OUTPATIENT
Start: 2018-01-03 | End: 2018-01-03

## 2018-01-03 RX ADMIN — CEFTRIAXONE 100 GRAM(S): 500 INJECTION, POWDER, FOR SOLUTION INTRAMUSCULAR; INTRAVENOUS at 11:58

## 2018-01-03 RX ADMIN — PANTOPRAZOLE SODIUM 40 MILLIGRAM(S): 20 TABLET, DELAYED RELEASE ORAL at 05:26

## 2018-01-03 RX ADMIN — Medication 2: at 11:57

## 2018-01-03 RX ADMIN — ATORVASTATIN CALCIUM 40 MILLIGRAM(S): 80 TABLET, FILM COATED ORAL at 21:37

## 2018-01-03 RX ADMIN — Medication 1: at 21:37

## 2018-01-03 NOTE — PROGRESS NOTE ADULT - SUBJECTIVE AND OBJECTIVE BOX
Neurology Follow up note    Name  MARK CALDERÓN    Subjective:  left sided weakness    Review of Systems:  Constitutional:        Eyes, Ears, Mouth, Throat:   Respiratory:                            Cardiovascular:   Gastrointestinal:                                     Genitourinary:   Musculoskeletal:                                    Dermatologic:   Neurological: as per above                                                                 Psychiatric:   Endocrine:              Hematologic/Lymphatic:     MEDICATIONS  (STANDING):  atorvastatin 40 milliGRAM(s) Oral at bedtime  enoxaparin Injectable 40 milliGRAM(s) SubCutaneous daily  insulin lispro (HumaLOG) corrective regimen sliding scale   SubCutaneous Before meals and at bedtime  pantoprazole    Tablet 40 milliGRAM(s) Oral two times a day before meals    MEDICATIONS  (PRN):  benzocaine 15 mG/menthol 3.6 mG Lozenge 1 Lozenge Oral every 6 hours PRN Sore Throat      Allergies    aspirin (Nausea)  pineapple (Other)    Intolerances        Objective:   Vital Signs Last 24 Hrs  T(C): 36.5 (03 Jan 2018 11:22), Max: 36.7 (02 Jan 2018 16:21)  T(F): 97.7 (03 Jan 2018 11:22), Max: 98 (02 Jan 2018 16:21)  HR: 89 (03 Jan 2018 11:22) (65 - 89)  BP: 106/54 (03 Jan 2018 11:22) (83/53 - 106/54)  BP(mean): --  RR: 16 (03 Jan 2018 11:22) (16 - 18)  SpO2: 100% (03 Jan 2018 11:22) (98% - 100%)    General Exam:   General appearance: No acute distress                 Cardiovascular: Pedal dorsalis pulses intact bilaterally    Neurological Exam:  Mental Status: Orientated to self, date and place.  Attention intact.  No dysarthria, aphasia or neglect.  Knowledge intact.  Registration intact.  Short and long term memory grossly intact.      Cranial Nerves: CN I - not tested.  PERRL, EOMI, VFF, no nystagmus or diplopia.  No APD.  Fundi not visualized bilaterally.  CN V1-3 intact to light touch and pinprick.  No facial asymmetry.  Hearing intact to finger rub bilaterally.  Tongue, uvula and palate midline.  Sternocleidomastoid and Trapezius intact bilaterally.    Motor:   Tone: normal.                  Strength: intact throughout  Pronator drift: none                 Dysmeria: None to finger-nose-finger or heel-shin-heel  No truncal ataxia.    Tremor: No resting, postural or action tremor.  No myoclonus.    Sensation: intact to light touch, pinprick, vibration and proprioception    Deep Tendon Reflexes: 1+ bilateral biceps, triceps, brachioradialis, knee and ankle  Toes flexor bilaterally    Gait: normal and stable.      Other:    01-02    135  |  100  |  22<H>  ----------------------------<  116<H>  3.9   |  27  |  0.67    Ca    8.5      02 Jan 2018 07:21      01-02    135  |  100  |  22<H>  ----------------------------<  116<H>  3.9   |  27  |  0.67    Ca    8.5      02 Jan 2018 07:21          Radiology    EKG:  tele:  TTE:  EEG: Neurology Follow up note    Name  MARK CALDERÓN    Subjective:  left arm weakness resolved  no new neurological symptoms    Review of Systems:  Respiratory:          no sob                  Cardiovascular: no cp    MEDICATIONS  (STANDING):  atorvastatin 40 milliGRAM(s) Oral at bedtime  enoxaparin Injectable 40 milliGRAM(s) SubCutaneous daily  insulin lispro (HumaLOG) corrective regimen sliding scale   SubCutaneous Before meals and at bedtime  pantoprazole    Tablet 40 milliGRAM(s) Oral two times a day before meals    MEDICATIONS  (PRN):  benzocaine 15 mG/menthol 3.6 mG Lozenge 1 Lozenge Oral every 6 hours PRN Sore Throat      Allergies    aspirin (Nausea)  pineapple (Other)    Intolerances        Objective:   Vital Signs Last 24 Hrs  T(C): 36.5 (03 Jan 2018 11:22), Max: 36.7 (02 Jan 2018 16:21)  T(F): 97.7 (03 Jan 2018 11:22), Max: 98 (02 Jan 2018 16:21)  HR: 89 (03 Jan 2018 11:22) (65 - 89)  BP: 106/54 (03 Jan 2018 11:22) (83/53 - 106/54)  BP(mean): --  RR: 16 (03 Jan 2018 11:22) (16 - 18)  SpO2: 100% (03 Jan 2018 11:22) (98% - 100%)    General Exam:   General appearance: No acute distress                 Cardiovascular: Pedal dorsalis pulses intact bilaterally    Neurological Exam:  Mental Status: Orientated to self, date and place.  Attention intact.  No dysarthria, aphasia or neglect.      Cranial Nerves: CN I - not tested.  PERRL, EOMI, VFF, no nystagmus or diplopia.  No APD.  Fundi not visualized bilaterally.  CN V1-3 intact to light touch.  No facial asymmetry.      Motor:   Tone: normal.                  Strength: intact throughout  Pronator drift: none                 Dysmeria: None to finger-nose-finger or heel-shin-heel    Sensation: intact to light touch,    Gait: normal and stable.      Other: nihss 0    01-02    135  |  100  |  22<H>  ----------------------------<  116<H>  3.9   |  27  |  0.67    Ca    8.5      02 Jan 2018 07:21      01-02    135  |  100  |  22<H>  ----------------------------<  116<H>  3.9   |  27  |  0.67    Ca    8.5      02 Jan 2018 07:21        pt  resident

## 2018-01-03 NOTE — PROGRESS NOTE ADULT - PROBLEM SELECTOR PLAN 2
Patient reports 10lb weight loss with decreased PO intake and worsening GERD s/s  Last EGD 3 years prior, Colonoscopy 5 years prior  Folate, B12 within normal limits   CT imaging revealed dilation of esophagus with accumulation of food suggestive of achalasia vs. pseudoachalasia from obstruction at GE junction, mild nodular densities in both upper lungs, 1.6cm density in RLL with stellate borders, and hypodense lesions in both kidneys   GI Dr. Aguilera consulted - anticipating EGD 1/4  Hematology Oncology Dr. Danielle consulted Continue with protonix 40mg BID  GI planning as above

## 2018-01-03 NOTE — PROGRESS NOTE ADULT - ASSESSMENT
Impression:  left arm weakness, transient, lasting for hours concerning for ischemic stroke       Recommendations:  1.             Admit to telemetry   2.             MRI brain, MRA head without contrast, Carotid duplex (CD).  If unable to get MR imaging, please consider CTA head and neck in 24hours (no need for CD in this case).  If the patient is unable to get MR and unable to get IV contrast please repeat the CTH in 24hours and get a CD.  3.             TTE  4.             Please check HbA1C and fasting lipid profile  5.             Start ASA 81mg  and Lipitor 40mg HS  6.             BP goal of normal  7.             Frequent neurochecks  8.          Formal speech and swallow evaluation  9.          PT evaluation  10.          STAT CTH IF the patient has sudden change in mental status or neurological exam  11.          DVT PPx    Thank you for the courtesy of this consult. Impression:  left arm weakness, transient, lasting for hours concerning for ischemic stroke       Recommendations:  1.             Admit to telemetry   2.             MRI brain, MRA head without contrast, Carotid duplex (CD).  If unable to get MR imaging, please consider CTA head and neck in 24hours (no need for CD in this case).  If the patient is unable to get MR and unable to get IV contrast please repeat the CTH in 24hours and get a CD.  3.             TTE  4.             Please check HbA1C and fasting lipid profile  5.             Start ASA 81mg  and Lipitor 40mg HS  6.             BP goal of normal  7.          DVT PPx    Please call back when the tests are done.

## 2018-01-03 NOTE — PROGRESS NOTE ADULT - ASSESSMENT
62F PMHx DM II, Fe deficiency anemia, GERD, and PSH of bowel ileus s/p laparotomy, s/p hysterectomy admitted for assessment for TIA and hyponatermia, likely secondary to decreased PO intake. Patient now foundto have dysphagia and obstruction along GE junction.

## 2018-01-03 NOTE — PROGRESS NOTE ADULT - PROBLEM SELECTOR PLAN 3
Continue with protonix 40mg BID  GI planning as above Symptoms of LUE tingling and weakness resolved   CT head negative for acute pathology  EKG NSR; Troponin negative  Continue with aspirin, statin  Neurology Dr. Rendon following Symptoms of LUE tingling and weakness resolved   CT head negative for acute pathology  EKG NSR; Troponin negative  Continue with aspirin, statin  F/U MRI of head and carotid doppler   Neurology Dr. Rendon following

## 2018-01-03 NOTE — PROGRESS NOTE ADULT - PROBLEM SELECTOR PLAN 1
CT head negative for acute pathology  EKG NSR; Troponin negative  Continue with aspirin, statin  F/U TTE, Carotid Doppler, CT C-spine, MRI of head ??  Neurology Dr. Rendon following Patient reports 10lb weight loss with decreased PO intake and worsening GERD s/s  Last EGD 3 years prior, Colonoscopy 5 years prior  Folate, B12 within normal limits   CT imaging revealed dilation of esophagus with accumulation of food suggestive of achalasia vs. pseudoachalasia from obstruction at GE junction, mild nodular densities in both upper lungs, 1.6cm density in RLL with stellate borders, and hypodense lesions in both kidneys   GI Dr. Aguilera consulted - anticipating EGD 1/4  Hematology Oncology Dr. Danielle consulted Patient reports 10lb weight loss with decreased PO intake and worsening GERD s/s  Last EGD 3 years prior, Colonoscopy 5 years prior  Folate, B12 within normal limits   CT imaging revealed dilation of esophagus with accumulation of food suggestive of achalasia vs. pseudoachalasia from obstruction at GE junction, mild nodular densities in both upper lungs, 1.6cm density in RLL with stellate borders, and hypodense lesions in both kidneys   GI Dr. Aguilera consulted - anticipating EGD 1/4  Hematology Oncology Dr. Danielle consulted - recommends outpatient PET scan Patient reports 10lb weight loss with decreased PO intake and worsening GERD s/s  Last EGD 3 years prior, Colonoscopy 5 years prior  Folate, B12 within normal limits   CT imaging revealed dilation of esophagus with accumulation of food suggestive of achalasia vs. pseudoachalasia from obstruction at GE junction, mild nodular densities in both upper lungs, 1.6cm density in RLL with stellate borders, and hypodense lesions in both kidneys   Renal US revealed a 1.8 cm cyst in the upper pole region of the right kidney, a 3.5 cm cyst is identified within the mid pole region of the right kidney, in addition to a 3.8 cm cyst is in the upper pole region of the left kidney   GI Dr. Aguilera consulted - anticipating EGD 1/4  Hematology Oncology Dr. Danielle consulted - recommends outpatient PET scan

## 2018-01-03 NOTE — CONSULT NOTE ADULT - PROBLEM SELECTOR RECOMMENDATION 2
needs to know it is malig or not and needs to know it is a met or primary if turns out to be malig  will need a PET as outpt

## 2018-01-03 NOTE — CONSULT NOTE ADULT - CONSULT REASON
left arm weakness
Patient reports 10lb weight loss with decreased PO intake and worsening GERD s/s
dilated esophagus

## 2018-01-03 NOTE — PROGRESS NOTE ADULT - SUBJECTIVE AND OBJECTIVE BOX
PGY 1 Note discussed with supervising resident and primary attending    Patient is a 62y old  Female who presents with a chief complaint of Left side arm weakness onset yesterday morning (01 Jan 2018 12:18)      INTERVAL HPI/OVERNIGHT EVENTS: patient examined at bedside. He/She has no complaints and current symptoms are resolving    Overnight events on telemetry monitoring []    MEDICATIONS  (STANDING):  atorvastatin 40 milliGRAM(s) Oral at bedtime  cefTRIAXone   IVPB 1 Gram(s) IV Intermittent every 24 hours  cefTRIAXone   IVPB      enoxaparin Injectable 40 milliGRAM(s) SubCutaneous daily  insulin lispro (HumaLOG) corrective regimen sliding scale   SubCutaneous Before meals and at bedtime  pantoprazole    Tablet 40 milliGRAM(s) Oral two times a day before meals    MEDICATIONS  (PRN):  benzocaine 15 mG/menthol 3.6 mG Lozenge 1 Lozenge Oral every 6 hours PRN Sore Throat    _______________________________________________  REVIEW OF SYSTEMS:  CONSTITUTIONAL: No fever  NECK: No pain or stiffness  RESPIRATORY: No cough; No shortness of breath  CARDIOVASCULAR: No chest pain, no palpitations  GASTROINTESTINAL: No pain. No constipation, nausea or vomiting; No diarrhea   NEUROLOGICAL: No headache or numbness, no tremors  MUSCULOSKELETAL: No joint pain, no muscle pain  GENITOURINARY: no dysuria, no frequency, no hesitancy  PSYCHIATRY: no depression , no anxiety    Vital Signs Last 24 Hrs  T(C): 36.4 (03 Jan 2018 04:48), Max: 36.8 (02 Jan 2018 07:28)  T(F): 97.6 (03 Jan 2018 04:48), Max: 98.2 (02 Jan 2018 07:28)  HR: 65 (03 Jan 2018 04:48) (65 - 85)  BP: 94/55 (03 Jan 2018 04:48) (83/53 - 94/55)  BP(mean): --  RR: 16 (03 Jan 2018 04:48) (16 - 18)  SpO2: 100% (03 Jan 2018 04:48) (98% - 100%)  ________________________________________________  PHYSICAL EXAM:  GENERAL: NAD, lying in bed  HEENT: Normocephalic;  conjunctivae and sclerae clear; moist mucous membranes  NECK : supple, trachea midline, no JVD  CHEST/LUNG: Clear to auscultation bilaterally with good air entry   HEART: S1 S2,  regular rate and rhythm,; no murmurs  ABDOMEN: Soft, Nontender, Nondistended; Bowel sounds present  EXTREMITIES: no cyanosis; no edema; no calf tenderness  SKIN: no rash  NERVOUS SYSTEM:  AAOx3; no new focal deficits  _________________________________________________  LABS:                        11.1   4.2   )-----------( 251      ( 02 Jan 2018 12:50 )             36.8     01-02    135  |  100  |  22<H>  ----------------------------<  116<H>  3.9   |  27  |  0.67    Ca    8.5      02 Jan 2018 07:21  Phos  2.2     01-01  Mg     2.1     01-01          CAPILLARY BLOOD GLUCOSE      POCT Blood Glucose.: 237 mg/dL (02 Jan 2018 20:50)  POCT Blood Glucose.: 90 mg/dL (02 Jan 2018 15:57)  POCT Blood Glucose.: 114 mg/dL (02 Jan 2018 11:02)  POCT Blood Glucose.: 111 mg/dL (02 Jan 2018 07:18)    RADIOLOGY & ADDITIONAL TESTS:    Consultant(s) Notes Reviewed:   YES    Care Discussed with Consultants:   Infectious Disease [] Endocrinology [] Neurology [] ENT [] Cardiology [] Electrophysiology [] Pulmonology [] Gastroenterology [x] Nephrology [] Urology [] Orthopaedics [] Vascular Surgery [] Thoracic Surgery [] Plastic Surgery [] General Surgery [] Podiatry [] Psychiatry [] Hematology/Oncology [x] Pain [] Palliative Care []    Plan of care was discussed with patient and/or primary care giver; all questions and concerns were addressed and care was aligned with patient's wishes. PGY 1 Note discussed with supervising resident and primary attending    Patient is a 62y old  Female who presents with a chief complaint of Left side arm weakness onset yesterday morning (01 Jan 2018 12:18)      INTERVAL HPI/OVERNIGHT EVENTS: patient examined at bedside. She has no complaints overnight and reports having no dysphagia overnight.     Overnight events on telemetry monitoring []    MEDICATIONS  (STANDING):  atorvastatin 40 milliGRAM(s) Oral at bedtime  cefTRIAXone   IVPB 1 Gram(s) IV Intermittent every 24 hours  cefTRIAXone   IVPB      enoxaparin Injectable 40 milliGRAM(s) SubCutaneous daily  insulin lispro (HumaLOG) corrective regimen sliding scale   SubCutaneous Before meals and at bedtime  pantoprazole    Tablet 40 milliGRAM(s) Oral two times a day before meals    MEDICATIONS  (PRN):  benzocaine 15 mG/menthol 3.6 mG Lozenge 1 Lozenge Oral every 6 hours PRN Sore Throat    _______________________________________________  REVIEW OF SYSTEMS:  CONSTITUTIONAL: No fever  RESPIRATORY: No cough; No shortness of breath  CARDIOVASCULAR: No chest pain, no palpitations  GASTROINTESTINAL: No pain. No constipation, nausea or vomiting; No diarrhea   NEUROLOGICAL: No headache or numbness, no tremors  MUSCULOSKELETAL: No joint pain, no muscle pain  GENITOURINARY: no dysuria, no frequency, no hesitancy    Vital Signs Last 24 Hrs  T(C): 36.4 (03 Jan 2018 04:48), Max: 36.8 (02 Jan 2018 07:28)  T(F): 97.6 (03 Jan 2018 04:48), Max: 98.2 (02 Jan 2018 07:28)  HR: 65 (03 Jan 2018 04:48) (65 - 85)  BP: 94/55 (03 Jan 2018 04:48) (83/53 - 94/55)  BP(mean): --  RR: 16 (03 Jan 2018 04:48) (16 - 18)  SpO2: 100% (03 Jan 2018 04:48) (98% - 100%)  ________________________________________________  PHYSICAL EXAM:  GENERAL: NAD, lying in bed  CHEST/LUNG: Clear to auscultation bilaterally with good air entry   HEART: S1 S2,  regular rate and rhythm,; no murmurs  ABDOMEN: Soft, Nontender, Nondistended; Bowel sounds present  EXTREMITIES: no cyanosis; no edema; no calf tenderness  NERVOUS SYSTEM:  AAOx3; no new focal deficits  _________________________________________________  LABS:                        11.1   4.2   )-----------( 251      ( 02 Jan 2018 12:50 )             36.8     01-02    135  |  100  |  22<H>  ----------------------------<  116<H>  3.9   |  27  |  0.67    Ca    8.5      02 Jan 2018 07:21  Phos  2.2     01-01  Mg     2.1     01-01          CAPILLARY BLOOD GLUCOSE      POCT Blood Glucose.: 237 mg/dL (02 Jan 2018 20:50)  POCT Blood Glucose.: 90 mg/dL (02 Jan 2018 15:57)  POCT Blood Glucose.: 114 mg/dL (02 Jan 2018 11:02)  POCT Blood Glucose.: 111 mg/dL (02 Jan 2018 07:18)    RADIOLOGY & ADDITIONAL TESTS:    Consultant(s) Notes Reviewed:   YES    Care Discussed with Consultants:   Infectious Disease [] Endocrinology [] Neurology [x] ENT [] Cardiology [] Electrophysiology [] Pulmonology [] Gastroenterology [x] Nephrology [] Urology [] Orthopaedics [] Vascular Surgery [] Thoracic Surgery [] Plastic Surgery [] General Surgery [] Podiatry [] Psychiatry [] Hematology/Oncology [x] Pain [] Palliative Care []    Plan of care was discussed with patient and/or primary care giver; all questions and concerns were addressed and care was aligned with patient's wishes.

## 2018-01-04 ENCOUNTER — RESULT REVIEW (OUTPATIENT)
Age: 63
End: 2018-01-04

## 2018-01-04 DIAGNOSIS — B37.81 CANDIDAL ESOPHAGITIS: ICD-10-CM

## 2018-01-04 LAB
ANION GAP SERPL CALC-SCNC: 8 MMOL/L — SIGNIFICANT CHANGE UP (ref 5–17)
B2 MICROGLOB 24H UR-MCNC: 3.71 MG/L — HIGH
BUN SERPL-MCNC: 16 MG/DL — SIGNIFICANT CHANGE UP (ref 7–18)
CALCIUM SERPL-MCNC: 8.1 MG/DL — LOW (ref 8.4–10.5)
CHLORIDE SERPL-SCNC: 102 MMOL/L — SIGNIFICANT CHANGE UP (ref 96–108)
CO2 SERPL-SCNC: 27 MMOL/L — SIGNIFICANT CHANGE UP (ref 22–31)
CREAT SERPL-MCNC: 0.5 MG/DL — SIGNIFICANT CHANGE UP (ref 0.5–1.3)
GLUCOSE BLDC GLUCOMTR-MCNC: 100 MG/DL — HIGH (ref 70–99)
GLUCOSE BLDC GLUCOMTR-MCNC: 105 MG/DL — HIGH (ref 70–99)
GLUCOSE BLDC GLUCOMTR-MCNC: 82 MG/DL — SIGNIFICANT CHANGE UP (ref 70–99)
GLUCOSE BLDC GLUCOMTR-MCNC: 84 MG/DL — SIGNIFICANT CHANGE UP (ref 70–99)
GLUCOSE SERPL-MCNC: 96 MG/DL — SIGNIFICANT CHANGE UP (ref 70–99)
HCT VFR BLD CALC: 31.8 % — LOW (ref 34.5–45)
HGB BLD-MCNC: 9.7 G/DL — LOW (ref 11.5–15.5)
MCHC RBC-ENTMCNC: 23.3 PG — LOW (ref 27–34)
MCHC RBC-ENTMCNC: 30.7 GM/DL — LOW (ref 32–36)
MCV RBC AUTO: 76.1 FL — LOW (ref 80–100)
PLATELET # BLD AUTO: 202 K/UL — SIGNIFICANT CHANGE UP (ref 150–400)
POTASSIUM SERPL-MCNC: 3.7 MMOL/L — SIGNIFICANT CHANGE UP (ref 3.5–5.3)
POTASSIUM SERPL-SCNC: 3.7 MMOL/L — SIGNIFICANT CHANGE UP (ref 3.5–5.3)
RBC # BLD: 4.18 M/UL — SIGNIFICANT CHANGE UP (ref 3.8–5.2)
RBC # FLD: 14.5 % — SIGNIFICANT CHANGE UP (ref 10.3–14.5)
SODIUM SERPL-SCNC: 137 MMOL/L — SIGNIFICANT CHANGE UP (ref 135–145)
WBC # BLD: 3.3 K/UL — LOW (ref 3.8–10.5)
WBC # FLD AUTO: 3.3 K/UL — LOW (ref 3.8–10.5)

## 2018-01-04 PROCEDURE — 93880 EXTRACRANIAL BILAT STUDY: CPT | Mod: 26

## 2018-01-04 PROCEDURE — 88305 TISSUE EXAM BY PATHOLOGIST: CPT | Mod: 26,59

## 2018-01-04 PROCEDURE — 88312 SPECIAL STAINS GROUP 1: CPT | Mod: 26

## 2018-01-04 PROCEDURE — 43239 EGD BIOPSY SINGLE/MULTIPLE: CPT

## 2018-01-04 PROCEDURE — 88305 TISSUE EXAM BY PATHOLOGIST: CPT | Mod: 26

## 2018-01-04 PROCEDURE — 88312 SPECIAL STAINS GROUP 1: CPT | Mod: 26,59

## 2018-01-04 PROCEDURE — 70551 MRI BRAIN STEM W/O DYE: CPT | Mod: 26

## 2018-01-04 RX ORDER — FLUCONAZOLE 150 MG/1
200 TABLET ORAL DAILY
Qty: 0 | Refills: 0 | Status: DISCONTINUED | OUTPATIENT
Start: 2018-01-04 | End: 2018-01-05

## 2018-01-04 RX ADMIN — PANTOPRAZOLE SODIUM 40 MILLIGRAM(S): 20 TABLET, DELAYED RELEASE ORAL at 07:06

## 2018-01-04 RX ADMIN — ATORVASTATIN CALCIUM 40 MILLIGRAM(S): 80 TABLET, FILM COATED ORAL at 22:39

## 2018-01-04 RX ADMIN — FLUCONAZOLE 200 MILLIGRAM(S): 150 TABLET ORAL at 12:00

## 2018-01-04 RX ADMIN — PANTOPRAZOLE SODIUM 40 MILLIGRAM(S): 20 TABLET, DELAYED RELEASE ORAL at 16:52

## 2018-01-04 NOTE — PROGRESS NOTE ADULT - ATTENDING COMMENTS
PATIENT WAS SEEN AND EXAMINED  - TRANSIENT LEFT UPPER EXTREMITY WEAKNESS - RESOLVED - SUSPECT MILD CERVICAL SPINAL STENOSIS AND CERVICAL RADICULOPATHY - OUT PATIENT FOLLOW UP WITH NEUROLOGIST FOR FURTHER WORK UP  - RAPID LOSS OF WEIGHT - RULE OUT ESOPHAGEAL / METASTATIC LUNG MALIGNANCY  ( REVIEWED CT CHEST / ABDOMEN / PELVIS WITH CONTRAST ) - EGD ON THURSDAY AND FOLLOW UP WITH TISSUE DIAGNOSIS . IF ESOPHAGEAL BX IS NOT BY ANY REASON, IR TO OBTAIN Bx OF LUNG LESIONS FOR TISSUE DIAGNOSIS BY PATHOLOGIST  - H/O ESOPHAGEAL LESIONS IN THE PAST, S/P EGD FEW YEARS AGO - ACHALASIA   - RESOLVED HYPONATREMIA  - MICROCYTIC ANEMIA  - D/W PATIENT AND STAFF  - DR. IRVIN
Covering for Dr. Perdomo    Patient was seen and examined at bedside    cc: s/p egd today.    vitals are stable  exam as above    labs reviewed    a/p:  -agree with resident's a/p  -egd shows esophageal candidiasis and continue fluconazole x 2wks and f/u outpatient for manometry and possible surgical myotomy  -lung nodule: outpatient PET scan  -tia: pending mri and carotid doppler

## 2018-01-04 NOTE — PROGRESS NOTE ADULT - PROBLEM SELECTOR PLAN 5
IMPROVE VTE score = 1 for age   No need for DVT chemoprophylaxis as patient is ambulatory   Protonix for GI prophylaxis Patient recently discontinued home dose of glimepiride  HSS and accuchecks  A1c = 6.7

## 2018-01-04 NOTE — PROGRESS NOTE ADULT - SUBJECTIVE AND OBJECTIVE BOX
PGY 1 Note discussed with supervising resident and primary attending    Patient is a 62y old  Female who presents with a chief complaint of Left side arm weakness onset yesterday morning (01 Jan 2018 12:18)      INTERVAL HPI/OVERNIGHT EVENTS: patient examined at bedside. He/She has no complaints and current symptoms are resolving    Overnight events on telemetry monitoring []    MEDICATIONS  (STANDING):  atorvastatin 40 milliGRAM(s) Oral at bedtime  enoxaparin Injectable 40 milliGRAM(s) SubCutaneous daily  insulin lispro (HumaLOG) corrective regimen sliding scale   SubCutaneous Before meals and at bedtime  pantoprazole    Tablet 40 milliGRAM(s) Oral two times a day before meals    MEDICATIONS  (PRN):  benzocaine 15 mG/menthol 3.6 mG Lozenge 1 Lozenge Oral every 6 hours PRN Sore Throat    _______________________________________________  REVIEW OF SYSTEMS:  CONSTITUTIONAL: No fever  NECK: No pain or stiffness  RESPIRATORY: No cough; No shortness of breath  CARDIOVASCULAR: No chest pain, no palpitations  GASTROINTESTINAL: No pain. No constipation, nausea or vomiting; No diarrhea   NEUROLOGICAL: No headache or numbness, no tremors  MUSCULOSKELETAL: No joint pain, no muscle pain  GENITOURINARY: no dysuria, no frequency, no hesitancy  PSYCHIATRY: no depression , no anxiety    Vital Signs Last 24 Hrs  T(C): 36.4 (04 Jan 2018 04:53), Max: 36.9 (03 Jan 2018 20:18)  T(F): 97.6 (04 Jan 2018 04:53), Max: 98.4 (03 Jan 2018 20:18)  HR: 69 (04 Jan 2018 04:53) (69 - 89)  BP: 90/45 (04 Jan 2018 04:53) (87/52 - 106/54)  BP(mean): --  RR: 16 (04 Jan 2018 04:53) (16 - 17)  SpO2: 98% (04 Jan 2018 04:53) (98% - 100%)  ________________________________________________  PHYSICAL EXAM:  GENERAL: NAD, lying in bed  HEENT: Normocephalic;  conjunctivae and sclerae clear; moist mucous membranes  NECK : supple, trachea midline, no JVD  CHEST/LUNG: Clear to auscultation bilaterally with good air entry   HEART: S1 S2,  regular rate and rhythm,; no murmurs  ABDOMEN: Soft, Nontender, Nondistended; Bowel sounds present  EXTREMITIES: no cyanosis; no edema; no calf tenderness  SKIN: no rash  NERVOUS SYSTEM:  AAOx3; no new focal deficits  _________________________________________________  LABS:                        11.1   4.2   )-----------( 251      ( 02 Jan 2018 12:50 )             36.8     01-02    135  |  100  |  22<H>  ----------------------------<  116<H>  3.9   |  27  |  0.67    Ca    8.5      02 Jan 2018 07:21          CAPILLARY BLOOD GLUCOSE      POCT Blood Glucose.: 173 mg/dL (03 Jan 2018 20:56)  POCT Blood Glucose.: 108 mg/dL (03 Jan 2018 16:13)  POCT Blood Glucose.: 212 mg/dL (03 Jan 2018 11:16)  POCT Blood Glucose.: 95 mg/dL (03 Jan 2018 07:22)    RADIOLOGY & ADDITIONAL TESTS:    Consultant(s) Notes Reviewed:   YES    Care Discussed with Consultants:   Infectious Disease [] Endocrinology [] Neurology [x] ENT [] Cardiology [] Electrophysiology [] Pulmonology [] Gastroenterology [x] Nephrology [] Urology [] Orthopaedics [] Vascular Surgery [] Thoracic Surgery [] Plastic Surgery [] General Surgery [] Podiatry [] Psychiatry [] Hematology/Oncology [x] Pain [] Palliative Care []    Plan of care was discussed with patient and/or primary care giver; all questions and concerns were addressed and care was aligned with patient's wishes. PGY 1 Note discussed with supervising resident and primary attending    Patient is a 62y old  Female who presents with a chief complaint of Left side arm weakness onset yesterday morning (01 Jan 2018 12:18)      INTERVAL HPI/OVERNIGHT EVENTS: patient examined at bedside. She has no complaints, including dysphagia, overnight.     Overnight events on telemetry monitoring []    MEDICATIONS  (STANDING):  atorvastatin 40 milliGRAM(s) Oral at bedtime  enoxaparin Injectable 40 milliGRAM(s) SubCutaneous daily  insulin lispro (HumaLOG) corrective regimen sliding scale   SubCutaneous Before meals and at bedtime  pantoprazole    Tablet 40 milliGRAM(s) Oral two times a day before meals    MEDICATIONS  (PRN):  benzocaine 15 mG/menthol 3.6 mG Lozenge 1 Lozenge Oral every 6 hours PRN Sore Throat    _______________________________________________  REVIEW OF SYSTEMS:  CONSTITUTIONAL: No fever  RESPIRATORY: No cough; No shortness of breath  CARDIOVASCULAR: No chest pain, no palpitations  GASTROINTESTINAL: No pain. No constipation, nausea or vomiting; No diarrhea   NEUROLOGICAL: No headache or numbness, no tremors  MUSCULOSKELETAL: No joint pain, no muscle pain    Vital Signs Last 24 Hrs  T(C): 36.4 (04 Jan 2018 04:53), Max: 36.9 (03 Jan 2018 20:18)  T(F): 97.6 (04 Jan 2018 04:53), Max: 98.4 (03 Jan 2018 20:18)  HR: 69 (04 Jan 2018 04:53) (69 - 89)  BP: 90/45 (04 Jan 2018 04:53) (87/52 - 106/54)  BP(mean): --  RR: 16 (04 Jan 2018 04:53) (16 - 17)  SpO2: 98% (04 Jan 2018 04:53) (98% - 100%)  ________________________________________________  PHYSICAL EXAM:  GENERAL: NAD, lying in bed  CHEST/LUNG: Clear to auscultation bilaterally with good air entry   HEART: S1 S2,  regular rate and rhythm,; no murmurs  ABDOMEN: Soft, Nontender, Nondistended; Bowel sounds present  EXTREMITIES: no cyanosis; no edema; no calf tenderness  NERVOUS SYSTEM:  AAOx3; no new focal deficits  _________________________________________________  LABS:                        11.1   4.2   )-----------( 251      ( 02 Jan 2018 12:50 )             36.8     01-02    135  |  100  |  22<H>  ----------------------------<  116<H>  3.9   |  27  |  0.67    Ca    8.5      02 Jan 2018 07:21          CAPILLARY BLOOD GLUCOSE      POCT Blood Glucose.: 173 mg/dL (03 Jan 2018 20:56)  POCT Blood Glucose.: 108 mg/dL (03 Jan 2018 16:13)  POCT Blood Glucose.: 212 mg/dL (03 Jan 2018 11:16)  POCT Blood Glucose.: 95 mg/dL (03 Jan 2018 07:22)    RADIOLOGY & ADDITIONAL TESTS:    Consultant(s) Notes Reviewed:   YES    Care Discussed with Consultants:   Infectious Disease [] Endocrinology [] Neurology [x] ENT [] Cardiology [] Electrophysiology [] Pulmonology [] Gastroenterology [x] Nephrology [] Urology [] Orthopaedics [] Vascular Surgery [] Thoracic Surgery [] Plastic Surgery [] General Surgery [] Podiatry [] Psychiatry [] Hematology/Oncology [x] Pain [] Palliative Care []    Plan of care was discussed with patient and/or primary care giver; all questions and concerns were addressed and care was aligned with patient's wishes.

## 2018-01-04 NOTE — PROGRESS NOTE ADULT - PROBLEM SELECTOR PLAN 2
Continue with protonix 40mg BID  GI planning as above Continue with fluconazole 200mg daily x 14 days  F/U HIV testing  Liquid diet with aspiration precautions

## 2018-01-04 NOTE — PROGRESS NOTE ADULT - PROBLEM SELECTOR PLAN 3
Symptoms of LUE tingling and weakness resolved   CT head negative for acute pathology  EKG NSR; Troponin negative  Continue with aspirin, statin  F/U MRI of head and carotid doppler   Neurology Dr. Rendon following Continue with protonix 40mg BID  GI planning as above

## 2018-01-04 NOTE — PROGRESS NOTE ADULT - PROBLEM SELECTOR PLAN 1
Patient reports 10lb weight loss with decreased PO intake and worsening GERD s/s  Last EGD 3 years prior, Colonoscopy 5 years prior  Folate, B12 within normal limits   CT imaging revealed dilation of esophagus with accumulation of food suggestive of achalasia vs. pseudoachalasia from obstruction at GE junction, mild nodular densities in both upper lungs, 1.6cm density in RLL with stellate borders, and hypodense lesions in both kidneys   Renal US revealed a 1.8 cm cyst in the upper pole region of the right kidney, a 3.5 cm cyst is identified within the mid pole region of the right kidney, in addition to a 3.8 cm cyst is in the upper pole region of the left kidney   GI Dr. Aguilera consulted - anticipating EGD 1/4  Hematology Oncology Dr. Danielle consulted - recommends outpatient PET scan Patient reports 10lb weight loss with decreased PO intake and worsening GERD s/s  CT imaging revealed dilation of esophagus with accumulation of food suggestive of achalasia vs. pseudoachalasia from obstruction at GE junction, mild nodular densities in both upper lungs, 1.6cm density in RLL with stellate borders, and hypodense lesions in both kidneys   Renal US revealed a 1.8 cm cyst in the upper pole region of the right kidney, a 3.5 cm cyst is identified within the mid pole region of the right kidney, in addition to a 3.8 cm cyst is in the upper pole region of the left kidney   EGD revealed esophageal candidiasis and no masses - patient will need outpatient manometry and potential surgical myotomy   GI Dr. Aguilera following   Hematology Oncology Dr. Danielle consulted - recommends outpatient PET scan

## 2018-01-04 NOTE — PROGRESS NOTE ADULT - PROBLEM SELECTOR PLAN 4
Patient recently discontinued home dose of glimepiride  HSS and accuchecks  A1c = 6.7 Symptoms of LUE tingling and weakness resolved   CT head negative for acute pathology  EKG NSR; Troponin negative  Continue with aspirin, statin  F/U MRI of head and carotid doppler   Neurology Dr. Rendon following

## 2018-01-05 VITALS
DIASTOLIC BLOOD PRESSURE: 67 MMHG | RESPIRATION RATE: 17 BRPM | HEART RATE: 76 BPM | SYSTOLIC BLOOD PRESSURE: 107 MMHG | OXYGEN SATURATION: 98 % | TEMPERATURE: 97 F

## 2018-01-05 LAB
GLUCOSE BLDC GLUCOMTR-MCNC: 110 MG/DL — HIGH (ref 70–99)
GLUCOSE BLDC GLUCOMTR-MCNC: 155 MG/DL — HIGH (ref 70–99)
GLUCOSE BLDC GLUCOMTR-MCNC: 98 MG/DL — SIGNIFICANT CHANGE UP (ref 70–99)
HCT VFR BLD CALC: 31.1 % — LOW (ref 34.5–45)
HGB BLD-MCNC: 9.3 G/DL — LOW (ref 11.5–15.5)
HIV 1+2 AB+HIV1 P24 AG SERPL QL IA: SIGNIFICANT CHANGE UP
MCHC RBC-ENTMCNC: 22.3 PG — LOW (ref 27–34)
MCHC RBC-ENTMCNC: 29.8 GM/DL — LOW (ref 32–36)
MCV RBC AUTO: 74.8 FL — LOW (ref 80–100)
PLATELET # BLD AUTO: 214 K/UL — SIGNIFICANT CHANGE UP (ref 150–400)
RBC # BLD: 4.15 M/UL — SIGNIFICANT CHANGE UP (ref 3.8–5.2)
RBC # FLD: 14.4 % — SIGNIFICANT CHANGE UP (ref 10.3–14.5)
WBC # BLD: 3.3 K/UL — LOW (ref 3.8–10.5)
WBC # FLD AUTO: 3.3 K/UL — LOW (ref 3.8–10.5)

## 2018-01-05 PROCEDURE — 83550 IRON BINDING TEST: CPT

## 2018-01-05 PROCEDURE — 86334 IMMUNOFIX E-PHORESIS SERUM: CPT

## 2018-01-05 PROCEDURE — 85610 PROTHROMBIN TIME: CPT

## 2018-01-05 PROCEDURE — 80061 LIPID PANEL: CPT

## 2018-01-05 PROCEDURE — 88312 SPECIAL STAINS GROUP 1: CPT

## 2018-01-05 PROCEDURE — 74177 CT ABD & PELVIS W/CONTRAST: CPT

## 2018-01-05 PROCEDURE — 83735 ASSAY OF MAGNESIUM: CPT

## 2018-01-05 PROCEDURE — 70491 CT SOFT TISSUE NECK W/DYE: CPT

## 2018-01-05 PROCEDURE — 82570 ASSAY OF URINE CREATININE: CPT

## 2018-01-05 PROCEDURE — 99285 EMERGENCY DEPT VISIT HI MDM: CPT | Mod: 25

## 2018-01-05 PROCEDURE — 70551 MRI BRAIN STEM W/O DYE: CPT

## 2018-01-05 PROCEDURE — 82553 CREATINE MB FRACTION: CPT

## 2018-01-05 PROCEDURE — 85730 THROMBOPLASTIN TIME PARTIAL: CPT

## 2018-01-05 PROCEDURE — 82550 ASSAY OF CK (CPK): CPT

## 2018-01-05 PROCEDURE — 80048 BASIC METABOLIC PNL TOTAL CA: CPT

## 2018-01-05 PROCEDURE — 83615 LACTATE (LD) (LDH) ENZYME: CPT

## 2018-01-05 PROCEDURE — 82607 VITAMIN B-12: CPT

## 2018-01-05 PROCEDURE — 83036 HEMOGLOBIN GLYCOSYLATED A1C: CPT

## 2018-01-05 PROCEDURE — 82746 ASSAY OF FOLIC ACID SERUM: CPT

## 2018-01-05 PROCEDURE — 82728 ASSAY OF FERRITIN: CPT

## 2018-01-05 PROCEDURE — 80074 ACUTE HEPATITIS PANEL: CPT

## 2018-01-05 PROCEDURE — 72125 CT NECK SPINE W/O DYE: CPT

## 2018-01-05 PROCEDURE — 70450 CT HEAD/BRAIN W/O DYE: CPT

## 2018-01-05 PROCEDURE — 88305 TISSUE EXAM BY PATHOLOGIST: CPT

## 2018-01-05 PROCEDURE — 84484 ASSAY OF TROPONIN QUANT: CPT

## 2018-01-05 PROCEDURE — 93005 ELECTROCARDIOGRAM TRACING: CPT

## 2018-01-05 PROCEDURE — 83935 ASSAY OF URINE OSMOLALITY: CPT

## 2018-01-05 PROCEDURE — 82962 GLUCOSE BLOOD TEST: CPT

## 2018-01-05 PROCEDURE — 80053 COMPREHEN METABOLIC PANEL: CPT

## 2018-01-05 PROCEDURE — 84300 ASSAY OF URINE SODIUM: CPT

## 2018-01-05 PROCEDURE — 93306 TTE W/DOPPLER COMPLETE: CPT

## 2018-01-05 PROCEDURE — 71260 CT THORAX DX C+: CPT

## 2018-01-05 PROCEDURE — 84165 PROTEIN E-PHORESIS SERUM: CPT

## 2018-01-05 PROCEDURE — 87389 HIV-1 AG W/HIV-1&-2 AB AG IA: CPT

## 2018-01-05 PROCEDURE — 84443 ASSAY THYROID STIM HORMONE: CPT

## 2018-01-05 PROCEDURE — 81001 URINALYSIS AUTO W/SCOPE: CPT

## 2018-01-05 PROCEDURE — 82232 ASSAY OF BETA-2 PROTEIN: CPT

## 2018-01-05 PROCEDURE — 84155 ASSAY OF PROTEIN SERUM: CPT

## 2018-01-05 PROCEDURE — 76775 US EXAM ABDO BACK WALL LIM: CPT

## 2018-01-05 PROCEDURE — 85027 COMPLETE CBC AUTOMATED: CPT

## 2018-01-05 PROCEDURE — 93880 EXTRACRANIAL BILAT STUDY: CPT

## 2018-01-05 PROCEDURE — 84100 ASSAY OF PHOSPHORUS: CPT

## 2018-01-05 PROCEDURE — 83020 HEMOGLOBIN ELECTROPHORESIS: CPT

## 2018-01-05 RX ORDER — ASPIRIN/CALCIUM CARB/MAGNESIUM 324 MG
1 TABLET ORAL
Qty: 30 | Refills: 0 | OUTPATIENT
Start: 2018-01-05 | End: 2018-02-03

## 2018-01-05 RX ORDER — FLUCONAZOLE 150 MG/1
1 TABLET ORAL
Qty: 13 | Refills: 0 | OUTPATIENT
Start: 2018-01-05 | End: 2018-01-17

## 2018-01-05 RX ORDER — PANTOPRAZOLE SODIUM 20 MG/1
1 TABLET, DELAYED RELEASE ORAL
Qty: 30 | Refills: 0 | OUTPATIENT
Start: 2018-01-05 | End: 2018-02-03

## 2018-01-05 RX ORDER — ASPIRIN/CALCIUM CARB/MAGNESIUM 324 MG
81 TABLET ORAL DAILY
Qty: 0 | Refills: 0 | Status: DISCONTINUED | OUTPATIENT
Start: 2018-01-05 | End: 2018-01-05

## 2018-01-05 RX ORDER — ATORVASTATIN CALCIUM 80 MG/1
1 TABLET, FILM COATED ORAL
Qty: 30 | Refills: 0 | OUTPATIENT
Start: 2018-01-05 | End: 2018-02-03

## 2018-01-05 RX ADMIN — PANTOPRAZOLE SODIUM 40 MILLIGRAM(S): 20 TABLET, DELAYED RELEASE ORAL at 06:31

## 2018-01-05 RX ADMIN — BENZOCAINE AND MENTHOL 1 LOZENGE: 5; 1 LIQUID ORAL at 06:32

## 2018-01-05 RX ADMIN — FLUCONAZOLE 200 MILLIGRAM(S): 150 TABLET ORAL at 11:54

## 2018-01-05 RX ADMIN — Medication 81 MILLIGRAM(S): at 11:55

## 2018-01-05 RX ADMIN — Medication 1: at 11:55

## 2018-01-05 NOTE — PROGRESS NOTE ADULT - PROBLEM SELECTOR PLAN 1
CT imaging revealed dilation of esophagus with accumulation of food suggestive of achalasia vs. pseudoachalasia from obstruction at GE junction, mild nodular densities in both upper lungs, 1.6cm density in RLL with stellate borders, and hypodense lesions in both kidneys   Renal US revealed a 1.8 cm cyst in the upper pole region of the right kidney, a 3.5 cm cyst is identified within the mid pole region of the right kidney, in addition to a 3.8 cm cyst is in the upper pole region of the left kidney   EGD revealed esophageal candidiasis and no masses - patient will need outpatient manometry and potential surgical myotomy   GI Dr. Aguilera following   Hematology Oncology Dr. Danielle consulted - recommends outpatient PET scan

## 2018-01-05 NOTE — PROGRESS NOTE ADULT - PROVIDER SPECIALTY LIST ADULT
Gastroenterology
Heme/Onc
Internal Medicine
Neurology
Internal Medicine

## 2018-01-05 NOTE — PROGRESS NOTE ADULT - PROBLEM SELECTOR PLAN 4
Symptoms of LUE tingling and weakness resolved   CT head negative for acute pathology  EKG NSR; Troponin negative  MRI negative for acute pathology  Carotid doppler revealed moderate stenosis of right internal carotid artery  Continue with statin  Neurology Dr. Rendon following Symptoms of LUE tingling and weakness resolved   CT head negative for acute pathology  EKG NSR; Troponin negative  MRI negative for acute pathology  Carotid doppler revealed moderate stenosis of right internal carotid artery  Continue with statin and aspirin   Neurology Dr. Rendon following

## 2018-01-05 NOTE — PROGRESS NOTE ADULT - SUBJECTIVE AND OBJECTIVE BOX
PGY 1 Note discussed with supervising resident and primary attending    Patient is a 62y old  Female who presents with a chief complaint of Left side arm weakness onset yesterday morning (01 Jan 2018 12:18)      INTERVAL HPI/OVERNIGHT EVENTS: patient examined at bedside. He/She has no complaints and current symptoms are resolving    Overnight events on telemetry monitoring []    MEDICATIONS  (STANDING):  atorvastatin 40 milliGRAM(s) Oral at bedtime  fluconAZOLE   Tablet 200 milliGRAM(s) Oral daily  insulin lispro (HumaLOG) corrective regimen sliding scale   SubCutaneous Before meals and at bedtime  pantoprazole    Tablet 40 milliGRAM(s) Oral two times a day before meals    MEDICATIONS  (PRN):  benzocaine 15 mG/menthol 3.6 mG Lozenge 1 Lozenge Oral every 6 hours PRN Sore Throat    _______________________________________________  REVIEW OF SYSTEMS:  CONSTITUTIONAL: No fever  NECK: No pain or stiffness  RESPIRATORY: No cough; No shortness of breath  CARDIOVASCULAR: No chest pain, no palpitations  GASTROINTESTINAL: No pain. No constipation, nausea or vomiting; No diarrhea   NEUROLOGICAL: No headache or numbness, no tremors  MUSCULOSKELETAL: No joint pain, no muscle pain  GENITOURINARY: no dysuria, no frequency, no hesitancy  PSYCHIATRY: no depression , no anxiety    Vital Signs Last 24 Hrs  T(C): 36.4 (05 Jan 2018 04:42), Max: 37.1 (04 Jan 2018 07:37)  T(F): 97.6 (05 Jan 2018 04:42), Max: 98.8 (04 Jan 2018 15:15)  HR: 61 (05 Jan 2018 04:42) (60 - 84)  BP: 82/40 (05 Jan 2018 04:42) (82/40 - 100/64)  BP(mean): --  RR: 16 (05 Jan 2018 04:42) (16 - 17)  SpO2: 99% (05 Jan 2018 04:42) (96% - 100%)  ________________________________________________  PHYSICAL EXAM:  GENERAL: NAD, lying in bed  HEENT: Normocephalic;  conjunctivae and sclerae clear; moist mucous membranes  NECK : supple, trachea midline, no JVD  CHEST/LUNG: Clear to auscultation bilaterally with good air entry   HEART: S1 S2,  regular rate and rhythm,; no murmurs  ABDOMEN: Soft, Nontender, Nondistended; Bowel sounds present  EXTREMITIES: no cyanosis; no edema; no calf tenderness  SKIN: no rash  NERVOUS SYSTEM:  AAOx3; no new focal deficits  _________________________________________________  LABS:                        9.7    3.3   )-----------( 202      ( 04 Jan 2018 06:41 )             31.8     01-04    137  |  102  |  16  ----------------------------<  96  3.7   |  27  |  0.50    Ca    8.1<L>      04 Jan 2018 06:41          CAPILLARY BLOOD GLUCOSE      POCT Blood Glucose.: 105 mg/dL (04 Jan 2018 22:02)  POCT Blood Glucose.: 100 mg/dL (04 Jan 2018 16:47)  POCT Blood Glucose.: 84 mg/dL (04 Jan 2018 11:25)  POCT Blood Glucose.: 82 mg/dL (04 Jan 2018 07:30)    RADIOLOGY & ADDITIONAL TESTS:    Consultant(s) Notes Reviewed:   YES    Care Discussed with Consultants:   Infectious Disease [] Endocrinology [] Neurology [x] ENT [] Cardiology [] Electrophysiology [] Pulmonology [] Gastroenterology [x] Nephrology [] Urology [] Orthopaedics [] Vascular Surgery [] Thoracic Surgery [] Plastic Surgery [] General Surgery [] Podiatry [] Psychiatry [] Hematology/Oncology [x] Pain [] Palliative Care []    Plan of care was discussed with patient and/or primary care giver; all questions and concerns were addressed and care was aligned with patient's wishes. PGY 1 Note discussed with supervising resident and primary attending    Patient is a 62y old  Female who presents with a chief complaint of Left side arm weakness onset yesterday morning (01 Jan 2018 12:18)      INTERVAL HPI/OVERNIGHT EVENTS: patient examined at bedside. She has no complaints overnight.     Overnight events on telemetry monitoring []    MEDICATIONS  (STANDING):  atorvastatin 40 milliGRAM(s) Oral at bedtime  fluconAZOLE   Tablet 200 milliGRAM(s) Oral daily  insulin lispro (HumaLOG) corrective regimen sliding scale   SubCutaneous Before meals and at bedtime  pantoprazole    Tablet 40 milliGRAM(s) Oral two times a day before meals    MEDICATIONS  (PRN):  benzocaine 15 mG/menthol 3.6 mG Lozenge 1 Lozenge Oral every 6 hours PRN Sore Throat    _______________________________________________  REVIEW OF SYSTEMS:  CONSTITUTIONAL: No fever  RESPIRATORY: No cough; No shortness of breath  CARDIOVASCULAR: No chest pain, no palpitations  GASTROINTESTINAL: No pain. No constipation, nausea or vomiting; No diarrhea   NEUROLOGICAL: No headache or numbness, no tremors  MUSCULOSKELETAL: No joint pain, no muscle pain    Vital Signs Last 24 Hrs  T(C): 36.4 (05 Jan 2018 04:42), Max: 37.1 (04 Jan 2018 07:37)  T(F): 97.6 (05 Jan 2018 04:42), Max: 98.8 (04 Jan 2018 15:15)  HR: 61 (05 Jan 2018 04:42) (60 - 84)  BP: 82/40 (05 Jan 2018 04:42) (82/40 - 100/64)  BP(mean): --  RR: 16 (05 Jan 2018 04:42) (16 - 17)  SpO2: 99% (05 Jan 2018 04:42) (96% - 100%)  ________________________________________________  PHYSICAL EXAM:  GENERAL: NAD, lying in bed  CHEST/LUNG: Clear to auscultation bilaterally with good air entry   HEART: S1 S2,  regular rate and rhythm,; no murmurs  ABDOMEN: Soft, Nontender, Nondistended; Bowel sounds present  EXTREMITIES: no cyanosis; no edema; no calf tenderness  NERVOUS SYSTEM:  AAOx3; no new focal deficits  _________________________________________________  LABS:                        9.7    3.3   )-----------( 202      ( 04 Jan 2018 06:41 )             31.8     01-04    137  |  102  |  16  ----------------------------<  96  3.7   |  27  |  0.50    Ca    8.1<L>      04 Jan 2018 06:41          CAPILLARY BLOOD GLUCOSE      POCT Blood Glucose.: 105 mg/dL (04 Jan 2018 22:02)  POCT Blood Glucose.: 100 mg/dL (04 Jan 2018 16:47)  POCT Blood Glucose.: 84 mg/dL (04 Jan 2018 11:25)  POCT Blood Glucose.: 82 mg/dL (04 Jan 2018 07:30)    RADIOLOGY & ADDITIONAL TESTS:    Consultant(s) Notes Reviewed:   YES    Care Discussed with Consultants:   Infectious Disease [] Endocrinology [] Neurology [x] ENT [] Cardiology [] Electrophysiology [] Pulmonology [] Gastroenterology [x] Nephrology [] Urology [] Orthopaedics [] Vascular Surgery [] Thoracic Surgery [] Plastic Surgery [] General Surgery [] Podiatry [] Psychiatry [] Hematology/Oncology [x] Pain [] Palliative Care []    Plan of care was discussed with patient and/or primary care giver; all questions and concerns were addressed and care was aligned with patient's wishes.

## 2018-01-05 NOTE — PROGRESS NOTE ADULT - SUBJECTIVE AND OBJECTIVE BOX
MARK CALDERÓN   [id number]   62y/Female    Patient is a 62y old  Female who presents with a chief complaint of Left side arm weakness, dysphagia and wt loss                                                Patient seen and examined at bedside.  Denies chest pain, palpitations, SOB, nausea, vomiting, abdominal pain.        T(C): 36.8 (01-05-18 @ 07:34), Max: 37.1 (01-04-18 @ 15:15)  HR: 73 (01-05-18 @ 07:34) (60 - 84)  BP: 100/59 (01-05-18 @ 07:34) (82/40 - 100/59)  RR: 16 (01-05-18 @ 07:34) (16 - 17)  SpO2: 98% (01-05-18 @ 07:34) (96% - 100%)  Wt(kg): --  I&O's Summary    04 Jan 2018 07:01  -  05 Jan 2018 07:00  --------------------------------------------------------  IN: 440 mL / OUT: 0 mL / NET: 440 mL    05 Jan 2018 07:01  -  05 Jan 2018 11:10  --------------------------------------------------------  IN: 230 mL / OUT: 0 mL / NET: 230 mL          REVIEW OF SYSTEMS:  No fever,   No cough, SOB  No chest pain, palpitations  No Abd pain, nausea, vomiting, No diarrhea or constipation                                                                                                    PHYSICAL EXAM    A X O x3  EYES: EOMI, PERRLA,  NECK: Supple, No JVD, Normal thyroid  Resp: CTAB, No crackles, wheezing,   CVS: Regular rate and rhythm; No murmurs, rubs, or gallops  ABD: Soft, Nontender, Nondistended; Bowel sounds present  EXTREMITIES:  2+ Peripheral Pulses, No edema                                                                                                           LABS:                        9.3    3.3   )-----------( 214      ( 05 Jan 2018 06:00 )             31.1     01-04    137  |  102  |  16  ----------------------------<  96  3.7   |  27  |  0.50    Ca    8.1<L>      04 Jan 2018 06:41          CAPILLARY BLOOD GLUCOSE      POCT Blood Glucose.: 98 mg/dL (05 Jan 2018 07:26)  POCT Blood Glucose.: 105 mg/dL (04 Jan 2018 22:02)  POCT Blood Glucose.: 100 mg/dL (04 Jan 2018 16:47)  POCT Blood Glucose.: 84 mg/dL (04 Jan 2018 11:25)                  Radiology:      EKG:

## 2018-01-05 NOTE — PROGRESS NOTE ADULT - PROBLEM SELECTOR PLAN 2
Continue with fluconazole 200mg daily x 14 days  F/U HIV testing  Liquid diet with aspiration precautions

## 2018-01-05 NOTE — PROGRESS NOTE ADULT - ASSESSMENT
62F PMHx DM II, Fe deficiency anemia, GERD, and PSH of bowel ileus s/p laparotomy, s/p hysterectomy admitted for assessment for TIA, Admission complicated by dysphagia, in which patient is found to have achalasia and esophageal candidiasis. Patient will be discharged with outpatient gastroenterologists follow up.

## 2018-01-05 NOTE — PROGRESS NOTE ADULT - PROBLEM SELECTOR PLAN 6
IMPROVE VTE score = 2 for age and immobilization  Lovenox for DVT chemoprophylaxis  Protonix for GI prophylaxis
IMPROVE VTE score = 1 for age   No need for DVT chemoprophylaxis as patient is ambulatory   Protonix for GI prophylaxis
IMPROVE VTE score = 1 for age   No need for DVT chemoprophylaxis as patient is ambulatory   Protonix for GI prophylaxis

## 2018-01-05 NOTE — PROGRESS NOTE ADULT - ASSESSMENT
Patient is s/p EGD for dyaphagia and wt loss  found to have esophageal stricture and esophageal candidiasis  started on fluconazaole, liquid diet with aspiration precautions  Patient to follow DR Aguilera as an out patient for mannometry studies and surgical myotomy  c/w protonix

## 2018-01-10 PROBLEM — K21.9 GASTRO-ESOPHAGEAL REFLUX DISEASE WITHOUT ESOPHAGITIS: Chronic | Status: ACTIVE | Noted: 2017-12-31

## 2018-01-10 PROBLEM — E11.9 TYPE 2 DIABETES MELLITUS WITHOUT COMPLICATIONS: Chronic | Status: ACTIVE | Noted: 2017-12-31

## 2018-01-16 ENCOUNTER — APPOINTMENT (OUTPATIENT)
Dept: GASTROENTEROLOGY | Facility: CLINIC | Age: 63
End: 2018-01-16

## 2018-01-18 ENCOUNTER — APPOINTMENT (OUTPATIENT)
Dept: GASTROENTEROLOGY | Facility: CLINIC | Age: 63
End: 2018-01-18
Payer: COMMERCIAL

## 2018-01-18 VITALS
WEIGHT: 124 LBS | HEIGHT: 64 IN | DIASTOLIC BLOOD PRESSURE: 68 MMHG | HEART RATE: 91 BPM | BODY MASS INDEX: 21.17 KG/M2 | TEMPERATURE: 97.8 F | RESPIRATION RATE: 16 BRPM | OXYGEN SATURATION: 98 % | SYSTOLIC BLOOD PRESSURE: 112 MMHG

## 2018-01-18 DIAGNOSIS — K22.0 ACHALASIA OF CARDIA: ICD-10-CM

## 2018-01-18 PROCEDURE — 99203 OFFICE O/P NEW LOW 30 MIN: CPT

## 2018-02-08 ENCOUNTER — APPOINTMENT (OUTPATIENT)
Dept: RADIOLOGY | Facility: HOSPITAL | Age: 63
End: 2018-02-08
Payer: COMMERCIAL

## 2018-02-08 ENCOUNTER — OUTPATIENT (OUTPATIENT)
Dept: OUTPATIENT SERVICES | Facility: HOSPITAL | Age: 63
LOS: 1 days | End: 2018-02-08
Payer: COMMERCIAL

## 2018-02-08 DIAGNOSIS — K22.0 ACHALASIA OF CARDIA: ICD-10-CM

## 2018-02-08 DIAGNOSIS — Z90.710 ACQUIRED ABSENCE OF BOTH CERVIX AND UTERUS: Chronic | ICD-10-CM

## 2018-02-08 DIAGNOSIS — S52.91XA UNSPECIFIED FRACTURE OF RIGHT FOREARM, INITIAL ENCOUNTER FOR CLOSED FRACTURE: Chronic | ICD-10-CM

## 2018-02-08 PROCEDURE — 74220 X-RAY XM ESOPHAGUS 1CNTRST: CPT

## 2018-02-08 PROCEDURE — 74220 X-RAY XM ESOPHAGUS 1CNTRST: CPT | Mod: 26

## 2018-03-01 ENCOUNTER — APPOINTMENT (OUTPATIENT)
Dept: GASTROENTEROLOGY | Facility: HOSPITAL | Age: 63
End: 2018-03-01

## 2018-03-01 ENCOUNTER — OUTPATIENT (OUTPATIENT)
Dept: OUTPATIENT SERVICES | Facility: HOSPITAL | Age: 63
LOS: 1 days | End: 2018-03-01
Payer: COMMERCIAL

## 2018-03-01 DIAGNOSIS — Z90.710 ACQUIRED ABSENCE OF BOTH CERVIX AND UTERUS: Chronic | ICD-10-CM

## 2018-03-01 DIAGNOSIS — K22.0 ACHALASIA OF CARDIA: ICD-10-CM

## 2018-03-01 DIAGNOSIS — S52.91XA UNSPECIFIED FRACTURE OF RIGHT FOREARM, INITIAL ENCOUNTER FOR CLOSED FRACTURE: Chronic | ICD-10-CM

## 2018-03-01 PROCEDURE — 91010 ESOPHAGUS MOTILITY STUDY: CPT | Mod: 26

## 2018-03-01 PROCEDURE — 91010 ESOPHAGUS MOTILITY STUDY: CPT

## 2018-03-01 PROCEDURE — 91037 ESOPH IMPED FUNCTION TEST: CPT | Mod: 26

## 2019-08-19 NOTE — PATIENT PROFILE ADULT. - NS PRO PT REFERRAL QUES 2 YN
It was a pleasure to see you today for a hemorrhoid.    Plan:  - Eat a high fiber diet  - Take fiber supplements.  Over the counter metamucil once daily.  - Drink plenty of water to keep your stool soft  - When your hemorrhoid is irritated - Sitz baths - Soak your bottom 6-8 inches in a warm soapy bath for 10-20 minutes four times daily.  Pat the area dry or use a hair dryer.  Do not use any soaps or scrub the area.  - Place a cotton ball, dry gauze, or pad to wick away any blood and moisture.    - We will do another anal exam at [facility].  I hope to get a better look at that time it it is just internal, or if it has an external component.  If it is all internal, we will band it.  If it has an external component, we can discuss doing an anal exam in the operating room under anesthesia and excising the hemorrhoid.  Hemorrhoid surgery with and incision in the operating room is very painful postop for at least 3 weeks.  - Hold your aspirin for 10 days prior to your procedure.    If you have any questions of concerns, please feel free to call  - [facility] (M,Tu) - [phone]  - [facility] (F) - [phone]  - [facility] (1 week per month) - [phone]  - Evenings and Weekends - [phone]    
no

## 2019-11-05 ENCOUNTER — EMERGENCY (EMERGENCY)
Facility: HOSPITAL | Age: 64
LOS: 1 days | Discharge: HOME | End: 2019-11-05
Attending: EMERGENCY MEDICINE
Payer: COMMERCIAL

## 2019-11-05 VITALS
OXYGEN SATURATION: 98 % | RESPIRATION RATE: 20 BRPM | DIASTOLIC BLOOD PRESSURE: 84 MMHG | HEART RATE: 92 BPM | TEMPERATURE: 98 F | WEIGHT: 160.06 LBS | SYSTOLIC BLOOD PRESSURE: 127 MMHG | HEIGHT: 64 IN

## 2019-11-05 VITALS
OXYGEN SATURATION: 100 % | SYSTOLIC BLOOD PRESSURE: 118 MMHG | TEMPERATURE: 98 F | RESPIRATION RATE: 20 BRPM | DIASTOLIC BLOOD PRESSURE: 76 MMHG | HEART RATE: 89 BPM

## 2019-11-05 DIAGNOSIS — S52.91XA UNSPECIFIED FRACTURE OF RIGHT FOREARM, INITIAL ENCOUNTER FOR CLOSED FRACTURE: Chronic | ICD-10-CM

## 2019-11-05 DIAGNOSIS — Z90.710 ACQUIRED ABSENCE OF BOTH CERVIX AND UTERUS: Chronic | ICD-10-CM

## 2019-11-05 LAB
ALBUMIN SERPL ELPH-MCNC: 4.3 G/DL — SIGNIFICANT CHANGE UP (ref 3.5–5)
ALP SERPL-CCNC: 118 U/L — SIGNIFICANT CHANGE UP (ref 40–120)
ALT FLD-CCNC: 30 U/L DA — SIGNIFICANT CHANGE UP (ref 10–60)
ANION GAP SERPL CALC-SCNC: 11 MMOL/L — SIGNIFICANT CHANGE UP (ref 5–17)
AST SERPL-CCNC: 32 U/L — SIGNIFICANT CHANGE UP (ref 10–40)
BASOPHILS # BLD AUTO: 0.04 K/UL — SIGNIFICANT CHANGE UP (ref 0–0.2)
BASOPHILS NFR BLD AUTO: 0.5 % — SIGNIFICANT CHANGE UP (ref 0–2)
BILIRUB SERPL-MCNC: 0.6 MG/DL — SIGNIFICANT CHANGE UP (ref 0.2–1.2)
BUN SERPL-MCNC: 37 MG/DL — HIGH (ref 7–18)
CALCIUM SERPL-MCNC: 10.8 MG/DL — HIGH (ref 8.4–10.5)
CHLORIDE SERPL-SCNC: 104 MMOL/L — SIGNIFICANT CHANGE UP (ref 96–108)
CO2 SERPL-SCNC: 26 MMOL/L — SIGNIFICANT CHANGE UP (ref 22–31)
CREAT SERPL-MCNC: 1.05 MG/DL — SIGNIFICANT CHANGE UP (ref 0.5–1.3)
EOSINOPHIL # BLD AUTO: 0.03 K/UL — SIGNIFICANT CHANGE UP (ref 0–0.5)
EOSINOPHIL NFR BLD AUTO: 0.4 % — SIGNIFICANT CHANGE UP (ref 0–6)
GLUCOSE SERPL-MCNC: 137 MG/DL — HIGH (ref 70–99)
HCT VFR BLD CALC: 42.4 % — SIGNIFICANT CHANGE UP (ref 34.5–45)
HGB BLD-MCNC: 12.6 G/DL — SIGNIFICANT CHANGE UP (ref 11.5–15.5)
IMM GRANULOCYTES NFR BLD AUTO: 0.4 % — SIGNIFICANT CHANGE UP (ref 0–1.5)
LIDOCAIN IGE QN: 136 U/L — SIGNIFICANT CHANGE UP (ref 73–393)
LYMPHOCYTES # BLD AUTO: 2.13 K/UL — SIGNIFICANT CHANGE UP (ref 1–3.3)
LYMPHOCYTES # BLD AUTO: 27.7 % — SIGNIFICANT CHANGE UP (ref 13–44)
MCHC RBC-ENTMCNC: 22.1 PG — LOW (ref 27–34)
MCHC RBC-ENTMCNC: 29.7 GM/DL — LOW (ref 32–36)
MCV RBC AUTO: 74.5 FL — LOW (ref 80–100)
MONOCYTES # BLD AUTO: 0.91 K/UL — HIGH (ref 0–0.9)
MONOCYTES NFR BLD AUTO: 11.8 % — SIGNIFICANT CHANGE UP (ref 2–14)
NEUTROPHILS # BLD AUTO: 4.56 K/UL — SIGNIFICANT CHANGE UP (ref 1.8–7.4)
NEUTROPHILS NFR BLD AUTO: 59.2 % — SIGNIFICANT CHANGE UP (ref 43–77)
NRBC # BLD: 0 /100 WBCS — SIGNIFICANT CHANGE UP (ref 0–0)
PLATELET # BLD AUTO: 321 K/UL — SIGNIFICANT CHANGE UP (ref 150–400)
POTASSIUM SERPL-MCNC: 5.5 MMOL/L — HIGH (ref 3.5–5.3)
POTASSIUM SERPL-SCNC: 5.5 MMOL/L — HIGH (ref 3.5–5.3)
PROT SERPL-MCNC: 9.9 G/DL — HIGH (ref 6–8.3)
RBC # BLD: 5.69 M/UL — HIGH (ref 3.8–5.2)
RBC # FLD: 17 % — HIGH (ref 10.3–14.5)
SODIUM SERPL-SCNC: 141 MMOL/L — SIGNIFICANT CHANGE UP (ref 135–145)
WBC # BLD: 7.7 K/UL — SIGNIFICANT CHANGE UP (ref 3.8–10.5)
WBC # FLD AUTO: 7.7 K/UL — SIGNIFICANT CHANGE UP (ref 3.8–10.5)

## 2019-11-05 PROCEDURE — 36415 COLL VENOUS BLD VENIPUNCTURE: CPT

## 2019-11-05 PROCEDURE — 99284 EMERGENCY DEPT VISIT MOD MDM: CPT

## 2019-11-05 PROCEDURE — 96375 TX/PRO/DX INJ NEW DRUG ADDON: CPT

## 2019-11-05 PROCEDURE — 83690 ASSAY OF LIPASE: CPT

## 2019-11-05 PROCEDURE — 80053 COMPREHEN METABOLIC PANEL: CPT

## 2019-11-05 PROCEDURE — 85027 COMPLETE CBC AUTOMATED: CPT

## 2019-11-05 PROCEDURE — 99284 EMERGENCY DEPT VISIT MOD MDM: CPT | Mod: 25

## 2019-11-05 PROCEDURE — 96374 THER/PROPH/DIAG INJ IV PUSH: CPT

## 2019-11-05 RX ORDER — ONDANSETRON 8 MG/1
1 TABLET, FILM COATED ORAL
Qty: 8 | Refills: 0
Start: 2019-11-05

## 2019-11-05 RX ORDER — ONDANSETRON 8 MG/1
4 TABLET, FILM COATED ORAL ONCE
Refills: 0 | Status: COMPLETED | OUTPATIENT
Start: 2019-11-05 | End: 2019-11-05

## 2019-11-05 RX ORDER — FAMOTIDINE 10 MG/ML
1 INJECTION INTRAVENOUS
Qty: 30 | Refills: 0
Start: 2019-11-05 | End: 2019-12-04

## 2019-11-05 RX ORDER — FAMOTIDINE 10 MG/ML
20 INJECTION INTRAVENOUS ONCE
Refills: 0 | Status: COMPLETED | OUTPATIENT
Start: 2019-11-05 | End: 2019-11-05

## 2019-11-05 RX ORDER — SODIUM CHLORIDE 9 MG/ML
1000 INJECTION INTRAMUSCULAR; INTRAVENOUS; SUBCUTANEOUS ONCE
Refills: 0 | Status: COMPLETED | OUTPATIENT
Start: 2019-11-05 | End: 2019-11-05

## 2019-11-05 RX ADMIN — FAMOTIDINE 20 MILLIGRAM(S): 10 INJECTION INTRAVENOUS at 20:52

## 2019-11-05 RX ADMIN — Medication 30 MILLILITER(S): at 22:52

## 2019-11-05 RX ADMIN — SODIUM CHLORIDE 1000 MILLILITER(S): 9 INJECTION INTRAMUSCULAR; INTRAVENOUS; SUBCUTANEOUS at 20:52

## 2019-11-05 RX ADMIN — ONDANSETRON 4 MILLIGRAM(S): 8 TABLET, FILM COATED ORAL at 20:51

## 2019-11-05 NOTE — ED PROVIDER NOTE - PATIENT PORTAL LINK FT
You can access the FollowMyHealth Patient Portal offered by [facility] by registering at the following website: [email or web address]. By joining [employer]’s FollowMyHealth portal, you will also be able to view your health information using other applications (apps) compatible with our system.

## 2019-11-05 NOTE — ED PROVIDER NOTE - OBJECTIVE STATEMENT
Patient is a 65 y/o female c/o vomiting, nausea, and inability to keep large amounts of food down since discontinuing ranitidine a few days ago. Patient reports she was taking ranitidine x 1 year but because of the FDA recall/warning she discontinued it. Patient denies abd pain, fever, chills, diarrhea, constipation. NKDA.

## 2019-11-05 NOTE — ED PROVIDER NOTE - NSFOLLOWUPINSTRUCTIONS_ED_ALL_ED_FT
Recommend you switch from ranitidine to famotidine daily for symptom relief.  Follow up with your primary care doctor and a gastroenterologist.  Return for worsening or other concerning symptoms.

## 2019-11-05 NOTE — ED PROVIDER NOTE - CLINICAL SUMMARY MEDICAL DECISION MAKING FREE TEXT BOX
63 y/o female presents with nausea, vomiting likely secondary to not taking H2 blocker w/ symptom control. Will check basic labs, reassess.

## 2020-11-16 NOTE — H&P ADULT - ASSESSMENT
Abdomen , soft, nontender, nondistended , no guarding or rigidity , no masses palpable , normal bowel sounds , Liver and Spleen , no hepatomegaly present , no hepatosplenomegaly , liver nontender , spleen not palpable
Patient is a 62y old  Female who presents with a chief complaint of Left side arm weakness onset yesterday morning (31 Dec 2017 09:13). Pt is admitted for suspected TIA, and generalized weakness from hyponatremia likely from poor oral intake.

## 2021-02-23 ENCOUNTER — EMERGENCY (EMERGENCY)
Facility: HOSPITAL | Age: 66
LOS: 1 days | Discharge: HOME | End: 2021-02-23
Attending: STUDENT IN AN ORGANIZED HEALTH CARE EDUCATION/TRAINING PROGRAM
Payer: COMMERCIAL

## 2021-02-23 VITALS
TEMPERATURE: 100 F | RESPIRATION RATE: 18 BRPM | DIASTOLIC BLOOD PRESSURE: 69 MMHG | SYSTOLIC BLOOD PRESSURE: 122 MMHG | WEIGHT: 132.06 LBS | HEART RATE: 84 BPM | HEIGHT: 64 IN | OXYGEN SATURATION: 96 %

## 2021-02-23 DIAGNOSIS — S52.91XA UNSPECIFIED FRACTURE OF RIGHT FOREARM, INITIAL ENCOUNTER FOR CLOSED FRACTURE: Chronic | ICD-10-CM

## 2021-02-23 DIAGNOSIS — Z90.710 ACQUIRED ABSENCE OF BOTH CERVIX AND UTERUS: Chronic | ICD-10-CM

## 2021-02-23 PROCEDURE — 99283 EMERGENCY DEPT VISIT LOW MDM: CPT | Mod: 25

## 2021-02-23 PROCEDURE — 99283 EMERGENCY DEPT VISIT LOW MDM: CPT

## 2021-02-23 PROCEDURE — 64450 NJX AA&/STRD OTHER PN/BRANCH: CPT

## 2021-02-23 RX ORDER — OXYCODONE AND ACETAMINOPHEN 5; 325 MG/1; MG/1
1 TABLET ORAL ONCE
Refills: 0 | Status: DISCONTINUED | OUTPATIENT
Start: 2021-02-23 | End: 2021-02-23

## 2021-02-23 RX ORDER — IBUPROFEN 200 MG
600 TABLET ORAL ONCE
Refills: 0 | Status: COMPLETED | OUTPATIENT
Start: 2021-02-23 | End: 2021-02-23

## 2021-02-23 RX ORDER — LIDOCAINE 4 G/100G
1 CREAM TOPICAL ONCE
Refills: 0 | Status: COMPLETED | OUTPATIENT
Start: 2021-02-23 | End: 2021-02-23

## 2021-02-23 RX ADMIN — OXYCODONE AND ACETAMINOPHEN 1 TABLET(S): 5; 325 TABLET ORAL at 19:21

## 2021-02-23 RX ADMIN — LIDOCAINE 1 APPLICATION(S): 4 CREAM TOPICAL at 18:12

## 2021-02-23 RX ADMIN — Medication 600 MILLIGRAM(S): at 18:12

## 2021-02-23 NOTE — ED PROVIDER NOTE - PROGRESS NOTE DETAILS
Unclear etiology of pain. Suspicion of early shingle outbreak. Explained to monitor for rash appearance and follow up with PMD in 1-2 days. Pt is well appearing walking with steady gait, stable for discharge and follow up without fail with medical doctor. I had a detailed discussion with the patient and/or guardian regarding the historical points, exam findings, and any diagnostic results supporting the discharge diagnosis. Pt educated on care and need for follow up. Strict return instructions and red flag signs and symptoms discussed with patient. Questions answered. Pt shows understanding of discharge information and agrees to follow.

## 2021-02-23 NOTE — ED PROVIDER NOTE - PHYSICAL EXAMINATION
L auricle without erythema, rash or induration, No fluctuance or open wounds. No ear canal erythema or induration. TM pearly grey and intact without rash. No mastoid tenderness or discoloration. NO rash to the scalp surrounding the L ear.

## 2021-02-23 NOTE — ED PROVIDER NOTE - OBJECTIVE STATEMENT
65F PMH of DM2 presents with complaint of left ear pain. States the pain began yesterday behind her left ear. Describes it as shooting pain that occurs spontaneously every few secs-mins and with palpation of the area. Has been taking Tylenol and Aleve with little relief. Denies having any fever, chills, rash, hearing changes, ear discharge, or tinnitus.

## 2021-02-23 NOTE — ED PROVIDER NOTE - NSFOLLOWUPINSTRUCTIONS_ED_ALL_ED_FT
Follow up with the primary care doctor in 1-2 days.  If you experience any new or worsening symptoms or if you are concerned you can always come back to the emergency for a re-evaluation.  Tylenol for pain as needed.

## 2021-02-23 NOTE — ED PROVIDER NOTE - ENMT NEGATIVE STATEMENT, MLM
Ears: External ear pain. Nose: no nasal congestion and no nasal drainage. Mouth/Throat: no dysphagia, no hoarseness and no throat pain. Neck: no lumps, no pain, no stiffness and no swollen glands.

## 2021-02-23 NOTE — ED ADULT NURSE NOTE - OBJECTIVE STATEMENT
pt from home c/o of Lt behind ear pain since yesterday pt describes it as shooting pain every couple seconds denies any injury denies any hearing loss

## 2021-02-23 NOTE — ED PROVIDER NOTE - CLINICAL SUMMARY MEDICAL DECISION MAKING FREE TEXT BOX
65F with posterior auricular ear pain. Internal and external ear exam unremarkable. Concern for early shingles given hx of chicken pox vs early cellulitis vs otitis externa. Will give trial of topical lidocaine and ibruprofen for pain control and reassess.

## 2021-02-23 NOTE — ED PROVIDER NOTE - ATTENDING CONTRIBUTION TO CARE
Patient presenting with left ear pain  L auricle without erythema, rash or induration, No fluctuance or open wounds. No ear canal erythema or induration. TM pearly grey and intact without rash. No mastoid tenderness or discoloration. NO rash to the scalp surrounding the L ear.  no erythema or warmth to suggest cellulitis or abscess  no mastoid ttp  possible early shingle, will treat pain and reassess

## 2021-02-23 NOTE — ED PROVIDER NOTE - ENMT, MLM
Airway patent, Nasal mucosa clear. Mouth with normal mucosa. Throat has no vesicles, no oropharyngeal exudates and uvula is midline. Normal tympanic membrane b/l. Posterior auricular area tender to palpation without changes to overlying skin.

## 2021-02-23 NOTE — ED PROVIDER NOTE - PATIENT PORTAL LINK FT
You can access the FollowMyHealth Patient Portal offered by [facility] by registering at the following website: [email or web address]. By joining [employer]’s FollowMyHealth portal, you will also be able to view your health information using other applications (apps) compatible with our system.

## 2022-12-29 NOTE — PROGRESS NOTE ADULT - PROBLEM SELECTOR PROBLEM 1
Detail Level: Generalized Instructions: This plan will send the code FBSE to the PM system.  DO NOT or CHANGE the price. Price (Do Not Change): 0.00 TIA (transient ischemic attack)
